# Patient Record
Sex: MALE | Race: BLACK OR AFRICAN AMERICAN | NOT HISPANIC OR LATINO
[De-identification: names, ages, dates, MRNs, and addresses within clinical notes are randomized per-mention and may not be internally consistent; named-entity substitution may affect disease eponyms.]

---

## 2018-06-11 PROBLEM — Z00.00 ENCOUNTER FOR PREVENTIVE HEALTH EXAMINATION: Status: ACTIVE | Noted: 2018-06-11

## 2018-06-15 ENCOUNTER — APPOINTMENT (OUTPATIENT)
Dept: ORTHOPEDIC SURGERY | Facility: CLINIC | Age: 53
End: 2018-06-15
Payer: COMMERCIAL

## 2018-06-15 VITALS
BODY MASS INDEX: 24.43 KG/M2 | HEART RATE: 87 BPM | HEIGHT: 66 IN | WEIGHT: 152 LBS | DIASTOLIC BLOOD PRESSURE: 90 MMHG | SYSTOLIC BLOOD PRESSURE: 142 MMHG

## 2018-06-15 DIAGNOSIS — Z86.79 PERSONAL HISTORY OF OTHER DISEASES OF THE CIRCULATORY SYSTEM: ICD-10-CM

## 2018-06-15 DIAGNOSIS — Z86.39 PERSONAL HISTORY OF OTHER ENDOCRINE, NUTRITIONAL AND METABOLIC DISEASE: ICD-10-CM

## 2018-06-15 PROCEDURE — 99203 OFFICE O/P NEW LOW 30 MIN: CPT

## 2018-06-15 PROCEDURE — 72170 X-RAY EXAM OF PELVIS: CPT | Mod: 59

## 2018-06-15 PROCEDURE — 72110 X-RAY EXAM L-2 SPINE 4/>VWS: CPT

## 2018-06-15 RX ORDER — METFORMIN HYDROCHLORIDE 625 MG/1
TABLET ORAL
Refills: 0 | Status: ACTIVE | COMMUNITY

## 2018-06-15 NOTE — DISCUSSION/SUMMARY
[Medication Risks Reviewed] : Medication risks reviewed [de-identified] : The patient's fairly significant weakness of his entire left leg primarily at the hips but also of the foot and ankle with ankle dorsiflexion. He does not have any neck pain no fracture his symptoms or spasticity or upper motor neuron some lower extremity suggesting a spinal cord or cranial pathology. Given the hypotonic weakness I recommended an MRI of the lumbar spine urgently to better evaluate his condition. He understands that based on the MRI findings additional interventions including surgery may be recommended for him.\par \par The patient was educated regarding their condition, treatment options as well as prescribed course of treatment. \par Risks and benefits as well as alternatives to the proposed treatment were also provided to the patient \par They were given the opportunity to have all their questions answered to their satisfaction.\par \par Vital signs were reviewed with the patient and the patient was instructed to followup with their primary care provider for further management.\par \par Healthy lifestyle recommendations were also made including a tobacco free lifestyle, proper diet, and weight control.

## 2018-06-15 NOTE — HISTORY OF PRESENT ILLNESS
[Pain] : pain [Worsening] : worsening [___ yrs] : [unfilled] year(s) ago [Sitting] : sitting [Lifting] : lifting [Constant] : ~He/She~ states the symptoms seem to be constant [Prolonged Standing] : worsened by prolonged standing [Standing] : worsened by standing [Walking] : worsened by walking [Rest] : relieved by rest [Joint Pain] : joint pain [Muscle Aches] : muscle aches [All Hx] : past medical, family, and social [All Other ROS Normal] : All other review of systems are negative except as noted [All] : medication and allergy [6] : currently ~his/her~ pain is 6 out of 10 [FreeTextEntry1] : Lower back pain [FreeTextEntry2] : Patient presents today due to lower back pain, reports ongoing for past 1 1/2 years.  No known injury.  Pain worsening over time.  Pain radiates into left leg, reports loss of sensation in leg into toes.  Reports receiving injection by neurologist on 6/4/2018 with no relief.  \par  in [employer].\par Pain in lower back and left leg pain, weakness of ankle dorsiflexion

## 2018-06-15 NOTE — PHYSICAL EXAM
[Apractic] : apractic [Full] : is full [] : Motor: [NL] : Motor strength of the right lower extremity was normal [___/5] : left ([unfilled]/5) [LE] : Sensory: Intact in bilateral lower extremities [2+] : left patella 2+ [1+] : right ankle jerk 1+ [0] : left ankle jerk 0 [DP] : dorsalis pedis 2+ and symmetric bilaterally [PT] : posterior tibial 2+ and symmetric bilaterally [Poor Appearance] : well-appearing [Acute Distress] : not in acute distress [Obese] : not obese [Abl Mood] : in a normal mood [Abl Affect] : with normal affect [Poor Coordination] : normal coordination [Disorientation] : oriented x 3 [Painful] : not painful [SLR] : negative straight leg raise [Plantar Reflex Right Only] : absent on the right [Plantar Reflex Left Only] : absent on the left [DTR Reflexes Clonus Of Right Ankle (___ Beats)] : absent on the right [DTR Reflexes Clonus Of Left Ankle (___ Beats)] : absent on the left [FreeTextEntry2] : The pt is awake, alert and oriented to self, place and time, is comfortable and in no acute distress. Inspection of neck, back and lower extremities bilaterally reveals no rashes or ecchymotic lesions.  There is no obvious abnormal spinal curvature in the sagittal and coronal planes. There is no tenderness over the cervical, thoracic or lumbar spine, or the paraspinal or upper and lower extremities musculature. There is no sacroiliac tenderness. No greater trochanteric tenderness bilaterally. No atrophy or abnormal movements noted in the upper or lower extremities. There is no swelling noted in the upper or lower extremities bilaterally. No cervical lymphadenopathy noted anteriorly. No joint laxity noted in the upper and lower extremity joints bilaterally.\par Lumbar spine range of motion is pain free with forward flexion to [default value], extension [default value] degrees with no discomfort. Hip range of motion is degrees internal rotation 30° external rotation without pain. Full range of motion of the shoulders bilaterally with no significant pain\par Negative straight leg raise to 45° in the sitting position bilaterally. There is no groin pain with hip internal rotation and a negative VERONA test bilaterally.  [de-identified] : Seen today with a friend who translated [de-identified] : 4 views lumbar spine obtained today demonstrate minimal right-sided lumbar curve. On the lateral projection normal lumbar lordosis. Mild degeneration seen at L1-2 with loss of disc height and end plates closest as well as anterior osteophytes. Between flexion-extension no dynamic bili. No acute fractures.\par \par AP pelvis demonstrates normal appearance of the hips bilaterally. No fractures. No significant degeneration.

## 2018-06-15 NOTE — CONSULT LETTER
[Dear  ___] : Dear  [unfilled], [I had the pleasure of evaluating your patient, [unfilled].] : I had the pleasure of evaluating your patient, [unfilled]. [FreeTextEntry2] : Too [place] [FreeTextEntry1] : Thank you for this referral. I have enclosed my note for your review. Please feel free to contact my office if you have additional questions regarding this patient.\par \par Regards,\par Deepak Becker MD, FACS, FAAOS\par \par  of Orthopaedic Surgery\par [facility] [employer] [facility]\par Spinal Reconstruction Surgery\par Minimally Invasive Spinal Surgery\par [facility]

## 2018-06-16 ENCOUNTER — OUTPATIENT (OUTPATIENT)
Dept: OUTPATIENT SERVICES | Facility: HOSPITAL | Age: 53
LOS: 1 days | End: 2018-06-16
Payer: COMMERCIAL

## 2018-06-16 ENCOUNTER — APPOINTMENT (OUTPATIENT)
Dept: MRI IMAGING | Facility: CLINIC | Age: 53
End: 2018-06-16
Payer: COMMERCIAL

## 2018-06-16 DIAGNOSIS — Z00.8 ENCOUNTER FOR OTHER GENERAL EXAMINATION: ICD-10-CM

## 2018-06-16 PROCEDURE — 72148 MRI LUMBAR SPINE W/O DYE: CPT | Mod: 26

## 2018-06-16 PROCEDURE — 72148 MRI LUMBAR SPINE W/O DYE: CPT

## 2018-06-29 ENCOUNTER — APPOINTMENT (OUTPATIENT)
Dept: ORTHOPEDIC SURGERY | Facility: CLINIC | Age: 53
End: 2018-06-29
Payer: COMMERCIAL

## 2018-06-29 VITALS
HEART RATE: 92 BPM | WEIGHT: 152 LBS | BODY MASS INDEX: 24.43 KG/M2 | DIASTOLIC BLOOD PRESSURE: 94 MMHG | HEIGHT: 66 IN | SYSTOLIC BLOOD PRESSURE: 152 MMHG

## 2018-06-29 PROCEDURE — 99214 OFFICE O/P EST MOD 30 MIN: CPT

## 2018-09-24 ENCOUNTER — APPOINTMENT (OUTPATIENT)
Dept: ORTHOPEDIC SURGERY | Facility: CLINIC | Age: 53
End: 2018-09-24
Payer: COMMERCIAL

## 2018-09-24 VITALS
DIASTOLIC BLOOD PRESSURE: 82 MMHG | HEART RATE: 87 BPM | SYSTOLIC BLOOD PRESSURE: 180 MMHG | BODY MASS INDEX: 24.21 KG/M2 | WEIGHT: 150 LBS

## 2018-09-24 PROCEDURE — 99214 OFFICE O/P EST MOD 30 MIN: CPT

## 2018-11-19 ENCOUNTER — APPOINTMENT (OUTPATIENT)
Dept: NEUROLOGY | Facility: CLINIC | Age: 53
End: 2018-11-19
Payer: COMMERCIAL

## 2018-11-19 PROCEDURE — 95909 NRV CNDJ TST 5-6 STUDIES: CPT

## 2018-11-19 PROCEDURE — 95886 MUSC TEST DONE W/N TEST COMP: CPT

## 2018-11-21 ENCOUNTER — CHART COPY (OUTPATIENT)
Age: 53
End: 2018-11-21

## 2019-01-02 ENCOUNTER — APPOINTMENT (OUTPATIENT)
Dept: ORTHOPEDIC SURGERY | Facility: CLINIC | Age: 54
End: 2019-01-02

## 2019-02-22 ENCOUNTER — APPOINTMENT (OUTPATIENT)
Dept: ORTHOPEDIC SURGERY | Facility: CLINIC | Age: 54
End: 2019-02-22
Payer: COMMERCIAL

## 2019-02-22 VITALS
DIASTOLIC BLOOD PRESSURE: 89 MMHG | BODY MASS INDEX: 24.27 KG/M2 | HEART RATE: 91 BPM | WEIGHT: 151 LBS | HEIGHT: 66 IN | SYSTOLIC BLOOD PRESSURE: 135 MMHG

## 2019-02-22 PROCEDURE — 99214 OFFICE O/P EST MOD 30 MIN: CPT

## 2019-02-22 NOTE — DISCUSSION/SUMMARY
[Medication Risks Reviewed] : Medication risks reviewed [de-identified] : The patient continues to complain of weakness of his left leg especially of his hip flexors and his ankle dorsiflexors. He has more strength of knee extension. Nerve conduction studies did not reveal any radiculopathy an MRI lumbar spine did not reveal any significant pathology to explain his current symptoms. I recommended an MRI of the pelvis to complete evaluation of the lumbar plexus and his hip musculature. Recommended AFO which was prescribed for him today. Also recommended that he followup with a neurologist Dr. Vallecillo for consultation and additional diagnostic workup as appropriate. He understands the need for additional testing and treatment since I do not have a clear diagnosis of his current symptoms and cannot recommend any specific treatment without appropriate diagnosis.\par \par The patient was educated regarding their condition, treatment options as well as prescribed course of treatment. \par Risks and benefits as well as alternatives to the proposed treatment were also provided to the patient \par They were given the opportunity to have all their questions answered to their satisfaction.\par \par Vital signs were reviewed with the patient and the patient was instructed to followup with their primary care provider for further management.\par \par Healthy lifestyle recommendations were also made including a tobacco free lifestyle, proper diet, and weight control.

## 2019-02-22 NOTE — HISTORY OF PRESENT ILLNESS
[Worsening] : worsening [0] : a current pain level of 0/10 [Daily] : ~He/She~ states the symptoms seem to be occuring daily [Walking] : worsened by walking [None] : No relieving factors are noted [de-identified] : Patient is here today for re evaluation on his chronic left leg weakness. Patient states he saw Neurologist had emg/ncv told everything is negative. Patient states left leg is getting worse.\par Symptoms for 2 years. Worsening. Initially had flank

## 2019-02-22 NOTE — PHYSICAL EXAM
[de-identified] : Nerve conduction studies performed by Dr. Vallecillo on November 19, 2018 were reviewed with the patient. Normal study is noted and not consistent with a radiculopathy [Poor Appearance] : well-appearing [Acute Distress] : not in acute distress [Obese] : not obese [Abl Mood] : in a normal mood [Abl Affect] : with normal affect [Poor Coordination] : normal coordination [Disorientation] : oriented x 3 [Apractic] : apractic [Full] : is full [Painful] : not painful [SLR] : negative straight leg raise [] : Motor: [NL] : Motor strength of the right lower extremity was normal [___/5] : left ([unfilled]/5) [LE] : Sensory: Intact in bilateral lower extremities [2+] : left patella 2+ [1+] : right ankle jerk 1+ [0] : left ankle jerk 0 [Plantar Reflex Right Only] : absent on the right [Plantar Reflex Left Only] : absent on the left [DTR Reflexes Clonus Of Right Ankle (___ Beats)] : absent on the right [DTR Reflexes Clonus Of Left Ankle (___ Beats)] : absent on the left [DP] : dorsalis pedis 2+ and symmetric bilaterally [PT] : posterior tibial 2+ and symmetric bilaterally [FreeTextEntry2] : The pt is awake, alert and oriented to self, place and time, is comfortable and in no acute distress. Inspection of neck, back and lower extremities bilaterally reveals no rashes or ecchymotic lesions.  There is no obvious abnormal spinal curvature in the sagittal and coronal planes. There is no tenderness over the cervical, thoracic or lumbar spine, or the paraspinal or upper and lower extremities musculature. There is no sacroiliac tenderness. No greater trochanteric tenderness bilaterally. No atrophy or abnormal movements noted in the upper or lower extremities. There is no swelling noted in the upper or lower extremities bilaterally. No cervical lymphadenopathy noted anteriorly. No joint laxity noted in the upper and lower extremity joints bilaterally.\par Lumbar spine range of motion is pain free with forward flexion to [default value], extension [default value] degrees with no discomfort. Hip range of motion is degrees internal rotation 30° external rotation without pain. Full range of motion of the shoulders bilaterally with no significant pain\par Negative straight leg raise to 45° in the sitting position bilaterally. There is no groin pain with hip internal rotation and a negative VERONA test bilaterally.  [de-identified] : Seen today with a friend who translated

## 2022-06-13 ENCOUNTER — INPATIENT (INPATIENT)
Facility: HOSPITAL | Age: 57
LOS: 1 days | Discharge: HOME | DRG: 552 | End: 2022-06-15
Attending: INTERNAL MEDICINE | Admitting: INTERNAL MEDICINE
Payer: COMMERCIAL

## 2022-06-13 VITALS
OXYGEN SATURATION: 98 % | DIASTOLIC BLOOD PRESSURE: 92 MMHG | TEMPERATURE: 98 F | SYSTOLIC BLOOD PRESSURE: 184 MMHG | WEIGHT: 149.91 LBS | HEIGHT: 65 IN | HEART RATE: 89 BPM | RESPIRATION RATE: 16 BRPM

## 2022-06-13 DIAGNOSIS — Z29.9 ENCOUNTER FOR PROPHYLACTIC MEASURES, UNSPECIFIED: ICD-10-CM

## 2022-06-13 DIAGNOSIS — R10.9 UNSPECIFIED ABDOMINAL PAIN: ICD-10-CM

## 2022-06-13 DIAGNOSIS — R29.898 OTHER SYMPTOMS AND SIGNS INVOLVING THE MUSCULOSKELETAL SYSTEM: ICD-10-CM

## 2022-06-13 DIAGNOSIS — E11.9 TYPE 2 DIABETES MELLITUS WITHOUT COMPLICATIONS: ICD-10-CM

## 2022-06-13 DIAGNOSIS — E78.5 HYPERLIPIDEMIA, UNSPECIFIED: ICD-10-CM

## 2022-06-13 DIAGNOSIS — I10 ESSENTIAL (PRIMARY) HYPERTENSION: ICD-10-CM

## 2022-06-13 DIAGNOSIS — I63.9 CEREBRAL INFARCTION, UNSPECIFIED: ICD-10-CM

## 2022-06-13 DIAGNOSIS — M25.552 PAIN IN LEFT HIP: ICD-10-CM

## 2022-06-13 LAB
ALBUMIN SERPL ELPH-MCNC: 3.9 G/DL — SIGNIFICANT CHANGE UP (ref 3.3–5)
ALP SERPL-CCNC: 104 U/L — SIGNIFICANT CHANGE UP (ref 30–120)
ALT FLD-CCNC: 24 U/L DA — SIGNIFICANT CHANGE UP (ref 10–60)
ANION GAP SERPL CALC-SCNC: 9 MMOL/L — SIGNIFICANT CHANGE UP (ref 5–17)
APTT BLD: 28.1 SEC — SIGNIFICANT CHANGE UP (ref 27.5–35.5)
AST SERPL-CCNC: 16 U/L — SIGNIFICANT CHANGE UP (ref 10–40)
BASOPHILS # BLD AUTO: 0.04 K/UL — SIGNIFICANT CHANGE UP (ref 0–0.2)
BASOPHILS NFR BLD AUTO: 0.9 % — SIGNIFICANT CHANGE UP (ref 0–2)
BILIRUB SERPL-MCNC: 0.8 MG/DL — SIGNIFICANT CHANGE UP (ref 0.2–1.2)
BUN SERPL-MCNC: 14 MG/DL — SIGNIFICANT CHANGE UP (ref 7–23)
CALCIUM SERPL-MCNC: 9.6 MG/DL — SIGNIFICANT CHANGE UP (ref 8.4–10.5)
CHLORIDE SERPL-SCNC: 97 MMOL/L — SIGNIFICANT CHANGE UP (ref 96–108)
CO2 SERPL-SCNC: 30 MMOL/L — SIGNIFICANT CHANGE UP (ref 22–31)
CREAT SERPL-MCNC: 1.08 MG/DL — SIGNIFICANT CHANGE UP (ref 0.5–1.3)
EGFR: 81 ML/MIN/1.73M2 — SIGNIFICANT CHANGE UP
EOSINOPHIL # BLD AUTO: 0.09 K/UL — SIGNIFICANT CHANGE UP (ref 0–0.5)
EOSINOPHIL NFR BLD AUTO: 1.9 % — SIGNIFICANT CHANGE UP (ref 0–6)
GLUCOSE BLDC GLUCOMTR-MCNC: 201 MG/DL — HIGH (ref 70–99)
GLUCOSE BLDC GLUCOMTR-MCNC: 206 MG/DL — HIGH (ref 70–99)
GLUCOSE BLDC GLUCOMTR-MCNC: 242 MG/DL — HIGH (ref 70–99)
GLUCOSE BLDC GLUCOMTR-MCNC: 284 MG/DL — HIGH (ref 70–99)
GLUCOSE SERPL-MCNC: 300 MG/DL — HIGH (ref 70–99)
HCT VFR BLD CALC: 45.3 % — SIGNIFICANT CHANGE UP (ref 39–50)
HGB BLD-MCNC: 15.2 G/DL — SIGNIFICANT CHANGE UP (ref 13–17)
IMM GRANULOCYTES NFR BLD AUTO: 0.2 % — SIGNIFICANT CHANGE UP (ref 0–1.5)
INR BLD: 0.97 RATIO — SIGNIFICANT CHANGE UP (ref 0.88–1.16)
LYMPHOCYTES # BLD AUTO: 2.5 K/UL — SIGNIFICANT CHANGE UP (ref 1–3.3)
LYMPHOCYTES # BLD AUTO: 53.5 % — HIGH (ref 13–44)
MCHC RBC-ENTMCNC: 25.9 PG — LOW (ref 27–34)
MCHC RBC-ENTMCNC: 33.6 GM/DL — SIGNIFICANT CHANGE UP (ref 32–36)
MCV RBC AUTO: 77 FL — LOW (ref 80–100)
MONOCYTES # BLD AUTO: 0.46 K/UL — SIGNIFICANT CHANGE UP (ref 0–0.9)
MONOCYTES NFR BLD AUTO: 9.9 % — SIGNIFICANT CHANGE UP (ref 2–14)
NEUTROPHILS # BLD AUTO: 1.57 K/UL — LOW (ref 1.8–7.4)
NEUTROPHILS NFR BLD AUTO: 33.6 % — LOW (ref 43–77)
NRBC # BLD: 0 /100 WBCS — SIGNIFICANT CHANGE UP (ref 0–0)
PLATELET # BLD AUTO: 256 K/UL — SIGNIFICANT CHANGE UP (ref 150–400)
POTASSIUM SERPL-MCNC: 4.4 MMOL/L — SIGNIFICANT CHANGE UP (ref 3.5–5.3)
POTASSIUM SERPL-SCNC: 4.4 MMOL/L — SIGNIFICANT CHANGE UP (ref 3.5–5.3)
PROT SERPL-MCNC: 7.5 G/DL — SIGNIFICANT CHANGE UP (ref 6–8.3)
PROTHROM AB SERPL-ACNC: 11.2 SEC — SIGNIFICANT CHANGE UP (ref 10.5–13.4)
RBC # BLD: 5.88 M/UL — HIGH (ref 4.2–5.8)
RBC # FLD: 13.1 % — SIGNIFICANT CHANGE UP (ref 10.3–14.5)
SARS-COV-2 RNA SPEC QL NAA+PROBE: SIGNIFICANT CHANGE UP
SODIUM SERPL-SCNC: 136 MMOL/L — SIGNIFICANT CHANGE UP (ref 135–145)
TROPONIN I, HIGH SENSITIVITY RESULT: 7.4 NG/L — SIGNIFICANT CHANGE UP
WBC # BLD: 4.67 K/UL — SIGNIFICANT CHANGE UP (ref 3.8–10.5)
WBC # FLD AUTO: 4.67 K/UL — SIGNIFICANT CHANGE UP (ref 3.8–10.5)

## 2022-06-13 PROCEDURE — 93010 ELECTROCARDIOGRAM REPORT: CPT

## 2022-06-13 PROCEDURE — 73090 X-RAY EXAM OF FOREARM: CPT | Mod: 26,LT

## 2022-06-13 PROCEDURE — 99285 EMERGENCY DEPT VISIT HI MDM: CPT

## 2022-06-13 PROCEDURE — 70498 CT ANGIOGRAPHY NECK: CPT | Mod: 26,MA

## 2022-06-13 PROCEDURE — 73502 X-RAY EXAM HIP UNI 2-3 VIEWS: CPT | Mod: 26,LT

## 2022-06-13 PROCEDURE — 93306 TTE W/DOPPLER COMPLETE: CPT | Mod: 26

## 2022-06-13 PROCEDURE — 74019 RADEX ABDOMEN 2 VIEWS: CPT | Mod: 26

## 2022-06-13 PROCEDURE — 70496 CT ANGIOGRAPHY HEAD: CPT | Mod: 26,MA

## 2022-06-13 RX ORDER — INSULIN LISPRO 100/ML
VIAL (ML) SUBCUTANEOUS
Refills: 0 | Status: DISCONTINUED | OUTPATIENT
Start: 2022-06-13 | End: 2022-06-14

## 2022-06-13 RX ORDER — SODIUM CHLORIDE 9 MG/ML
1000 INJECTION, SOLUTION INTRAVENOUS
Refills: 0 | Status: DISCONTINUED | OUTPATIENT
Start: 2022-06-13 | End: 2022-06-14

## 2022-06-13 RX ORDER — LOSARTAN POTASSIUM 100 MG/1
50 TABLET, FILM COATED ORAL DAILY
Refills: 0 | Status: DISCONTINUED | OUTPATIENT
Start: 2022-06-13 | End: 2022-06-15

## 2022-06-13 RX ORDER — POLYETHYLENE GLYCOL 3350 17 G/17G
17 POWDER, FOR SOLUTION ORAL
Refills: 0 | Status: DISCONTINUED | OUTPATIENT
Start: 2022-06-13 | End: 2022-06-15

## 2022-06-13 RX ORDER — ASPIRIN/CALCIUM CARB/MAGNESIUM 324 MG
325 TABLET ORAL ONCE
Refills: 0 | Status: COMPLETED | OUTPATIENT
Start: 2022-06-13 | End: 2022-06-13

## 2022-06-13 RX ORDER — INSULIN LISPRO 100/ML
VIAL (ML) SUBCUTANEOUS AT BEDTIME
Refills: 0 | Status: DISCONTINUED | OUTPATIENT
Start: 2022-06-13 | End: 2022-06-14

## 2022-06-13 RX ORDER — SODIUM CHLORIDE 9 MG/ML
1000 INJECTION INTRAMUSCULAR; INTRAVENOUS; SUBCUTANEOUS ONCE
Refills: 0 | Status: COMPLETED | OUTPATIENT
Start: 2022-06-13 | End: 2022-06-13

## 2022-06-13 RX ORDER — DEXTROSE 50 % IN WATER 50 %
15 SYRINGE (ML) INTRAVENOUS ONCE
Refills: 0 | Status: DISCONTINUED | OUTPATIENT
Start: 2022-06-13 | End: 2022-06-14

## 2022-06-13 RX ORDER — INFLUENZA VIRUS VACCINE 15; 15; 15; 15 UG/.5ML; UG/.5ML; UG/.5ML; UG/.5ML
0.5 SUSPENSION INTRAMUSCULAR ONCE
Refills: 0 | Status: DISCONTINUED | OUTPATIENT
Start: 2022-06-13 | End: 2022-06-15

## 2022-06-13 RX ORDER — ENOXAPARIN SODIUM 100 MG/ML
40 INJECTION SUBCUTANEOUS EVERY 24 HOURS
Refills: 0 | Status: DISCONTINUED | OUTPATIENT
Start: 2022-06-13 | End: 2022-06-15

## 2022-06-13 RX ORDER — ATORVASTATIN CALCIUM 80 MG/1
80 TABLET, FILM COATED ORAL AT BEDTIME
Refills: 0 | Status: DISCONTINUED | OUTPATIENT
Start: 2022-06-13 | End: 2022-06-15

## 2022-06-13 RX ORDER — ASPIRIN/CALCIUM CARB/MAGNESIUM 324 MG
81 TABLET ORAL DAILY
Refills: 0 | Status: DISCONTINUED | OUTPATIENT
Start: 2022-06-13 | End: 2022-06-15

## 2022-06-13 RX ORDER — DEXTROSE 50 % IN WATER 50 %
12.5 SYRINGE (ML) INTRAVENOUS ONCE
Refills: 0 | Status: DISCONTINUED | OUTPATIENT
Start: 2022-06-13 | End: 2022-06-14

## 2022-06-13 RX ORDER — DEXTROSE 50 % IN WATER 50 %
25 SYRINGE (ML) INTRAVENOUS ONCE
Refills: 0 | Status: DISCONTINUED | OUTPATIENT
Start: 2022-06-13 | End: 2022-06-14

## 2022-06-13 RX ORDER — IOHEXOL 300 MG/ML
30 INJECTION, SOLUTION INTRAVENOUS ONCE
Refills: 0 | Status: DISCONTINUED | OUTPATIENT
Start: 2022-06-13 | End: 2022-06-15

## 2022-06-13 RX ORDER — METFORMIN HYDROCHLORIDE 850 MG/1
500 TABLET ORAL
Refills: 0 | Status: DISCONTINUED | OUTPATIENT
Start: 2022-06-13 | End: 2022-06-14

## 2022-06-13 RX ORDER — GLUCAGON INJECTION, SOLUTION 0.5 MG/.1ML
1 INJECTION, SOLUTION SUBCUTANEOUS ONCE
Refills: 0 | Status: DISCONTINUED | OUTPATIENT
Start: 2022-06-13 | End: 2022-06-15

## 2022-06-13 RX ADMIN — ENOXAPARIN SODIUM 40 MILLIGRAM(S): 100 INJECTION SUBCUTANEOUS at 15:51

## 2022-06-13 RX ADMIN — LOSARTAN POTASSIUM 50 MILLIGRAM(S): 100 TABLET, FILM COATED ORAL at 19:10

## 2022-06-13 RX ADMIN — Medication 81 MILLIGRAM(S): at 12:35

## 2022-06-13 RX ADMIN — Medication 325 MILLIGRAM(S): at 04:08

## 2022-06-13 RX ADMIN — ATORVASTATIN CALCIUM 80 MILLIGRAM(S): 80 TABLET, FILM COATED ORAL at 22:40

## 2022-06-13 RX ADMIN — Medication 3: at 19:08

## 2022-06-13 RX ADMIN — METFORMIN HYDROCHLORIDE 500 MILLIGRAM(S): 850 TABLET ORAL at 19:10

## 2022-06-13 RX ADMIN — SODIUM CHLORIDE 1000 MILLILITER(S): 9 INJECTION INTRAMUSCULAR; INTRAVENOUS; SUBCUTANEOUS at 04:36

## 2022-06-13 NOTE — ED ADULT NURSE NOTE - CHIEF COMPLAINT
The patient is a 56y Male complaining of weakness. PAST MEDICAL HISTORY:  Endometrial hyperplasia     Hypertension     Morbid obesity with BMI of 60.0-69.9, adult     Type 2 diabetes mellitus

## 2022-06-13 NOTE — PATIENT PROFILE ADULT - FALL HARM RISK - TYPE OF ASSESSMENT
[Normal] : normal rate, regular rhythm, normal S1 and S2 and no murmur heard [No Focal Deficits] : no focal deficits [Normal Affect] : the affect was normal [Normal Insight/Judgement] : insight and judgment were intact [de-identified] : shuffling gait, slow, needs assistance to get down from exam table. 6 item cognitive assessment score 23 Admission

## 2022-06-13 NOTE — H&P ADULT - NSHPPHYSICALEXAM_GEN_ALL_CORE
Vital Signs Last 24 Hrs  T(C): 36.6 (13 Jun 2022 07:43), Max: 36.7 (13 Jun 2022 03:09)  T(F): 97.8 (13 Jun 2022 07:43), Max: 98 (13 Jun 2022 03:09)  HR: 69 (13 Jun 2022 07:43) (69 - 89)  BP: 135/89 (13 Jun 2022 07:43) (135/89 - 184/92)  BP(mean): --  RR: 15 (13 Jun 2022 07:43) (15 - 16)  SpO2: 97% (13 Jun 2022 07:43) (97% - 98%) Vital Signs Last 24 Hrs  T(C): 36.6 (13 Jun 2022 07:43), Max: 36.7 (13 Jun 2022 03:09)  T(F): 97.8 (13 Jun 2022 07:43), Max: 98 (13 Jun 2022 03:09)  HR: 69 (13 Jun 2022 07:43) (69 - 89)  BP: 135/89 (13 Jun 2022 07:43) (135/89 - 184/92)  BP(mean): --  RR: 15 (13 Jun 2022 07:43) (15 - 16)  SpO2: 97% (13 Jun 2022 07:43) (97% - 98%)    PHYSICAL EXAM:  GENERAL: NAD, well-groomed, well-developed  HEAD:  Atraumatic, Normocephalic  EYES: EOMI, PERRLA, conjunctiva and sclera clear  ENMT: Moist mucous membranes, no lesions  NECK: Supple.  CHEST/LUNG: Clear to auscultation bilaterally; No rales, rhonchi, wheezing, or rubs  HEART: S1, S2.   ABDOMEN: Soft, Nontender, Nondistended; Bowel sounds present  EXTREMITIES:  2+ Peripheral Pulses, No clubbing, cyanosis, or edema  MS: No joint swelling or deformity.   LYMPH: No lymphadenopathy noted  SKIN: No rashes or lesions  NERVOUS SYSTEM:  patient has slight left leg weakness and foot drop.  Gait is not tested.  PSYCH:  Awake and alert.

## 2022-06-13 NOTE — H&P ADULT - NSHPSOCIALHISTORY_GEN_ALL_CORE
Social History:    Marital Status:   Occupation:   Lives with:     Substance Use :  Tobacco Usage:  (   ) never smoked   (   ) former smoker   (   ) current smoker  (     ) pack year  (        ) last tobacco use date  Alcohol Usage:    (     ) Advanced Directives: (     ) declined   [  ] health care proxy Social History:    Marital Status:   Occupation: Works as a   Lives with: family    Substance Use : denies  Tobacco Usage:  (X) never smoked   (   ) former smoker   (   ) current smoker  (     ) pack year  (        ) last tobacco use date  Alcohol Usage: denies    (     ) Advanced Directives: (     ) declined   [  ] health care proxy

## 2022-06-13 NOTE — H&P ADULT - HISTORY OF PRESENT ILLNESS
56 years old male with past medical history of hypertension, diabetes mellitus, hyperlipidemia, who has been having some left  56 years old male with past medical history of hypertension, diabetes mellitus, hyperlipidemia, who has been having some left leg weakness and gait abnormality for last many months.  Patient developed some lower abdominal pain few days ago and was seen at another facility.  Patient apparently had x-ray done and was advised that he had constipation.  Patient went back to work last night.  He had increasing weakness in his left leg and more pronounced footdrop.  He came into the emergency for the same.  Patient was evaluated and is being admitted to hospital as per ER physician for rule out CVA.    Patient continues to complain of left leg weakness and foot drop.  He also complains of left hip pain.  He also complained of some abdominal pain.  He denies any nausea or vomiting.  He does admit to some constipation.  Denies any fevers or chills.  Denies any dizziness or syncope.

## 2022-06-13 NOTE — H&P ADULT - NSHPLABSRESULTS_GEN_ALL_CORE
15.2   4.67  )-----------( 256      ( 13 Jun 2022 03:20 )             45.3     13 Jun 2022 03:20    136    |  97     |  14     ----------------------------<  300    4.4     |  30     |  1.08     Ca    9.6        13 Jun 2022 03:20    TPro  7.5    /  Alb  3.9    /  TBili  0.8    /  DBili  x      /  AST  16     /  ALT  24     /  AlkPhos  104    13 Jun 2022 03:20    CAPILLARY BLOOD GLUCOSE      POCT Blood Glucose.: 206 mg/dL (13 Jun 2022 11:52)  POCT Blood Glucose.: 253 mg/dL (13 Jun 2022 03:32)    PT/INR - ( 13 Jun 2022 03:20 )   PT: 11.2 sec;   INR: 0.97 ratio         PTT - ( 13 Jun 2022 03:20 )  PTT:28.1 sec

## 2022-06-13 NOTE — PATIENT PROFILE ADULT - FALL HARM RISK - HARM RISK INTERVENTIONS

## 2022-06-13 NOTE — ED PROVIDER NOTE - NEUROLOGICAL, MLM
alert, oriented, speech clear, comprehension intact, follows commands, left LE 4/5 motor, not dorsiflexing left foot to walk, ataxic, normal sensation, UEs and right LE 5/5

## 2022-06-13 NOTE — ED ADULT NURSE NOTE - OBJECTIVE STATEMENT
Pt stated that has been having problem with left leg for the past 5 years, was hurting and was weak, and has been having some "balance problem" for the past wks, last night, woke up and knock on his daughter's door, stated that his leg is feeling very weak and needs to go to the ER, pt able to ambulate with unsteady gait

## 2022-06-13 NOTE — CONSULT NOTE ADULT - SUBJECTIVE AND OBJECTIVE BOX
left leg weakness lbp rib leg pain ? lumbar rad rule out cva  for mri head lumbar and pelvis   pt eval  asa/statin for now  based on mri plan next step

## 2022-06-13 NOTE — ED PROVIDER NOTE - CLINICAL SUMMARY MEDICAL DECISION MAKING FREE TEXT BOX
56 y.o. M with DM and HTN BIB family for left LE weakness, pt reports some degree of chronic left LE weakness/unsteady gait - unclear if weeks/months/years, however, it appears clear that the weakness of the left leg significantly worsened when pt woke yesterday and gait is also worse - symptoms c/w acute stroke, onset is unclear, over 24hrs ago, appears that pt was seen at [facility] 5yrs ago for sciatica, which he blends into the h/o altered gait/weakness, seemingly did not have medical evaluation previously when gait became unsteady and weakness began as pt seems to have thought it was part of the previous issue

## 2022-06-13 NOTE — ED PROVIDER NOTE - OBJECTIVE STATEMENT
56 y.o. M with h/o DM and HTN BIB daughter c/o left leg weakness, pt states 5 years ago he went to [facility] for left leg pain, description by pt c/w sciatica, though states he was told nothing was wrong (and to take dulcolax for constipation?), then pt states for months (or years? answers change) he has had an unsteady gait and for weeks has felt weakness in left leg, but yesterday weakness became significantly worse, noticed the difference when he woke in the morning, says gait is worse as well, he seems to be dragging his foot instead of taking normal steps, denies paresthesias, feels his arms are strong, denies other neuro symptoms, denies recent illness, no fever, no cp, no sob, did have decreased appetite yesterday and decreased PO, no n/v, no abd pain (pt is a poor historian, unclear how long pt's gait has been unsteady or for how long he has had weakness in left leg  - weeks to years- but clearly the weakness in left LE became significantly worse yesterday, here now as he wasn't sleeping and woke his family to bring him to the hospital

## 2022-06-13 NOTE — H&P ADULT - NSHPREVIEWOFSYSTEMS_GEN_ALL_CORE
REVIEW OF SYSTEMS:  CONSTITUTIONAL: No fever, weight loss, or fatigue  EYES: No eye pain, or discharge  ENMT: No sinus or throat pain  NECK: No pain or stiffness  BREASTS: No pain, masses, or nipple discharge  RESPIRATORY: No cough, wheezing, chills or hemoptysis; No shortness of breath  CARDIOVASCULAR: No chest pain, palpitations, dizziness, or leg swelling  GASTROINTESTINAL: + abdominal pain.  No nausea, vomiting.  GENITOURINARY: No dysuria, frequency, hematuria, or incontinence  NEUROLOGICAL: Left leg weakness.   SKIN: No itching, burning, rashes, or lesions   LYMPH NODES: No enlarged glands  ENDOCRINE: No polydipsia or polyuria  MUSCULOSKELETAL: Left hip pain.   PSYCHIATRIC: No depression, anxiety, mood swings.  HEME/LYMPH: No easy bruising, or bleeding gums  ALLERGY AND IMMUNOLOGIC: No hives or eczema

## 2022-06-13 NOTE — H&P ADULT - VTE RISK ASSESSMENT
# disconnected-unable to reach pt about a new pt sleep apt-will mail letter. VTE Assessment already completed for this visit

## 2022-06-13 NOTE — ED ADULT NURSE NOTE - NSIMPLEMENTINTERV_GEN_ALL_ED
Implemented All Fall with Harm Risk Interventions:  [place] to call system. Call bell, personal items and telephone within reach. Instruct patient to call for assistance. Room bathroom lighting operational. Non-slip footwear when patient is off stretcher. Physically safe environment: no spills, clutter or unnecessary equipment. Stretcher in lowest position, wheels locked, appropriate side rails in place. Provide visual cue, wrist band, yellow gown, etc. Monitor gait and stability. Monitor for mental status changes and reorient to person, place, and time. Review medications for side effects contributing to fall risk. Reinforce activity limits and safety measures with patient and family. Provide visual clues: red socks.

## 2022-06-13 NOTE — H&P ADULT - ASSESSMENT
56 years old male with past medical history of hypertension, diabetes mellitus, hyperlipidemia, who has been having some left leg weakness and gait abnormality for last many months.  Patient developed some lower abdominal pain few days ago and was seen at another facility.  Patient apparently had x-ray done and was advised that he had constipation.  Patient went back to work last night.  He had increasing weakness in his left leg and more pronounced footdrop.  He came into the emergency for the same.  Patient was evaluated and is being admitted to hospital as per ER physician for rule out CVA.

## 2022-06-13 NOTE — ED ADULT NURSE NOTE - ASSOCIATED PAIN OR INJURY LOCATION
Formerly [facility], [facility]  Enedina Gipson P.A.-C.  [address] [address]  [place] [address]  Fax: [phone]   Authorization for Release/Disclosure of   Protected Health Information   Name: GRACIE MELCHOR : 1967 SSN: [id number]   Address: [address] [place] [address] [address]  [place] [address] Phone:    [phone] (home)    I authorize the entity listed below to release/disclose the PHI below to:   [facility], [facility]/Enedina Gipson P.A.-C. and Enedina Gipson P.A.-C.   Provider or Entity Name:  Dr. Eva Calvert   [facility], [facility]  [address] [place], [place] [address] Phone:  [phone]    Fax:  [phone]   Reason for request: continuity of care   Information to be released:    [  ] LAST COLONOSCOPY,  including any PATH REPORT and follow-up  [  ] LAST FIT/COLOGUARD RESULT [  ] LAST DEXA  [x] LAST MAMMOGRAM  [x] LAST PAP  [x] LAST LABS [  ] RETINA EXAM REPORT  [X] IMMUNIZATION RECORDS  [XX] Release all info      [  ] Check here and initial the line next to each item to release ALL health information INCLUDING  _____ Care and treatment for drug and / or alcohol abuse  _____ HIV testing, infection status, or AIDS  _____ Genetic Testing    DATES OF SERVICE OR TIME PERIOD TO BE DISCLOSED: _____________  I understand and acknowledge that:  * This Authorization may be revoked at any time by you in writing, except if your health information has already been used or disclosed.  * Your health information that will be used or disclosed as a result of you signing this authorization could be re-disclosed by the recipient. If this occurs, your re-disclosed health information may no longer be protected by State or Federal laws.  * You may refuse to sign this Authorization. Your refusal will not affect your ability to obtain treatment.  * This Authorization becomes effective upon signing and will  on (date) __________.      If no date is indicated, this Authorization will  one (1) year from the  signature date.    Name: Cecilia Taveras    Signature:   Date:     12/8/2017       PLEASE FAX REQUESTED RECORDS BACK TO: [phone]   left leg

## 2022-06-14 DIAGNOSIS — E11.65 TYPE 2 DIABETES MELLITUS WITH HYPERGLYCEMIA: ICD-10-CM

## 2022-06-14 DIAGNOSIS — E11.9 TYPE 2 DIABETES MELLITUS WITHOUT COMPLICATIONS: ICD-10-CM

## 2022-06-14 LAB
A1C WITH ESTIMATED AVERAGE GLUCOSE RESULT: 11.6 % — HIGH (ref 4–5.6)
ANION GAP SERPL CALC-SCNC: 8 MMOL/L — SIGNIFICANT CHANGE UP (ref 5–17)
BUN SERPL-MCNC: 13 MG/DL — SIGNIFICANT CHANGE UP (ref 7–23)
CALCIUM SERPL-MCNC: 9 MG/DL — SIGNIFICANT CHANGE UP (ref 8.4–10.5)
CHLORIDE SERPL-SCNC: 100 MMOL/L — SIGNIFICANT CHANGE UP (ref 96–108)
CHOLEST SERPL-MCNC: 167 MG/DL — SIGNIFICANT CHANGE UP
CO2 SERPL-SCNC: 28 MMOL/L — SIGNIFICANT CHANGE UP (ref 22–31)
CREAT SERPL-MCNC: 1.06 MG/DL — SIGNIFICANT CHANGE UP (ref 0.5–1.3)
EGFR: 82 ML/MIN/1.73M2 — SIGNIFICANT CHANGE UP
ESTIMATED AVERAGE GLUCOSE: 286 MG/DL — HIGH (ref 68–114)
GLUCOSE BLDC GLUCOMTR-MCNC: 196 MG/DL — HIGH (ref 70–99)
GLUCOSE BLDC GLUCOMTR-MCNC: 198 MG/DL — HIGH (ref 70–99)
GLUCOSE BLDC GLUCOMTR-MCNC: 220 MG/DL — HIGH (ref 70–99)
GLUCOSE BLDC GLUCOMTR-MCNC: 288 MG/DL — HIGH (ref 70–99)
GLUCOSE SERPL-MCNC: 222 MG/DL — HIGH (ref 70–99)
HCV AB S/CO SERPL IA: 0.11 S/CO — SIGNIFICANT CHANGE UP (ref 0–0.99)
HCV AB SERPL-IMP: SIGNIFICANT CHANGE UP
HDLC SERPL-MCNC: 40 MG/DL — LOW
LIPID PNL WITH DIRECT LDL SERPL: 110 MG/DL — HIGH
NON HDL CHOLESTEROL: 127 MG/DL — SIGNIFICANT CHANGE UP
POTASSIUM SERPL-MCNC: 4.8 MMOL/L — SIGNIFICANT CHANGE UP (ref 3.5–5.3)
POTASSIUM SERPL-SCNC: 4.8 MMOL/L — SIGNIFICANT CHANGE UP (ref 3.5–5.3)
SODIUM SERPL-SCNC: 136 MMOL/L — SIGNIFICANT CHANGE UP (ref 135–145)
TRIGL SERPL-MCNC: 85 MG/DL — SIGNIFICANT CHANGE UP

## 2022-06-14 PROCEDURE — 73700 CT LOWER EXTREMITY W/O DYE: CPT | Mod: 26,LT

## 2022-06-14 PROCEDURE — 70450 CT HEAD/BRAIN W/O DYE: CPT | Mod: 26

## 2022-06-14 PROCEDURE — 72131 CT LUMBAR SPINE W/O DYE: CPT | Mod: 26

## 2022-06-14 PROCEDURE — 99221 1ST HOSP IP/OBS SF/LOW 40: CPT

## 2022-06-14 PROCEDURE — 74176 CT ABD & PELVIS W/O CONTRAST: CPT | Mod: 26

## 2022-06-14 RX ORDER — DEXTROSE 50 % IN WATER 50 %
25 SYRINGE (ML) INTRAVENOUS ONCE
Refills: 0 | Status: DISCONTINUED | OUTPATIENT
Start: 2022-06-14 | End: 2022-06-15

## 2022-06-14 RX ORDER — SODIUM CHLORIDE 9 MG/ML
1000 INJECTION, SOLUTION INTRAVENOUS
Refills: 0 | Status: DISCONTINUED | OUTPATIENT
Start: 2022-06-14 | End: 2022-06-15

## 2022-06-14 RX ORDER — INSULIN LISPRO 100/ML
VIAL (ML) SUBCUTANEOUS
Refills: 0 | Status: DISCONTINUED | OUTPATIENT
Start: 2022-06-14 | End: 2022-06-15

## 2022-06-14 RX ORDER — DEXTROSE 50 % IN WATER 50 %
12.5 SYRINGE (ML) INTRAVENOUS ONCE
Refills: 0 | Status: DISCONTINUED | OUTPATIENT
Start: 2022-06-14 | End: 2022-06-15

## 2022-06-14 RX ORDER — GLUCAGON INJECTION, SOLUTION 0.5 MG/.1ML
1 INJECTION, SOLUTION SUBCUTANEOUS ONCE
Refills: 0 | Status: DISCONTINUED | OUTPATIENT
Start: 2022-06-14 | End: 2022-06-15

## 2022-06-14 RX ORDER — INSULIN LISPRO 100/ML
2 VIAL (ML) SUBCUTANEOUS
Refills: 0 | Status: DISCONTINUED | OUTPATIENT
Start: 2022-06-14 | End: 2022-06-15

## 2022-06-14 RX ORDER — DEXTROSE 50 % IN WATER 50 %
15 SYRINGE (ML) INTRAVENOUS ONCE
Refills: 0 | Status: DISCONTINUED | OUTPATIENT
Start: 2022-06-14 | End: 2022-06-15

## 2022-06-14 RX ORDER — INSULIN GLARGINE 100 [IU]/ML
8 INJECTION, SOLUTION SUBCUTANEOUS AT BEDTIME
Refills: 0 | Status: DISCONTINUED | OUTPATIENT
Start: 2022-06-14 | End: 2022-06-15

## 2022-06-14 RX ORDER — INSULIN LISPRO 100/ML
VIAL (ML) SUBCUTANEOUS AT BEDTIME
Refills: 0 | Status: DISCONTINUED | OUTPATIENT
Start: 2022-06-14 | End: 2022-06-15

## 2022-06-14 RX ADMIN — Medication 2: at 16:53

## 2022-06-14 RX ADMIN — ATORVASTATIN CALCIUM 80 MILLIGRAM(S): 80 TABLET, FILM COATED ORAL at 22:16

## 2022-06-14 RX ADMIN — Medication 1: at 08:07

## 2022-06-14 RX ADMIN — POLYETHYLENE GLYCOL 3350 17 GRAM(S): 17 POWDER, FOR SOLUTION ORAL at 16:55

## 2022-06-14 RX ADMIN — Medication 3: at 11:44

## 2022-06-14 RX ADMIN — ENOXAPARIN SODIUM 40 MILLIGRAM(S): 100 INJECTION SUBCUTANEOUS at 14:44

## 2022-06-14 RX ADMIN — Medication 2 UNIT(S): at 16:53

## 2022-06-14 RX ADMIN — INSULIN GLARGINE 8 UNIT(S): 100 INJECTION, SOLUTION SUBCUTANEOUS at 22:17

## 2022-06-14 RX ADMIN — LOSARTAN POTASSIUM 50 MILLIGRAM(S): 100 TABLET, FILM COATED ORAL at 06:28

## 2022-06-14 RX ADMIN — Medication 81 MILLIGRAM(S): at 11:11

## 2022-06-14 NOTE — PHYSICAL THERAPY INITIAL EVALUATION ADULT - PERTINENT HX OF CURRENT PROBLEM, REHAB EVAL
Admitted with worsening left LE weakness, left foot drop, abnormal gait x months. CT head negative for acute hemorrhage.

## 2022-06-14 NOTE — CONSULT NOTE ADULT - ASSESSMENT
Physical Exam:   Vital Signs Last 24 Hrs  T(C): 36.6 (14 Jun 2022 14:45), Max: 36.8 (14 Jun 2022 05:45)  T(F): 97.8 (14 Jun 2022 14:45), Max: 98.3 (14 Jun 2022 05:45)  HR: 70 (14 Jun 2022 14:45) (70 - 81)  BP: 151/96 (14 Jun 2022 14:45) (139/84 - 161/102)  BP(mean): --  RR: 17 (14 Jun 2022 14:45) (17 - 18)  SpO2: 97% (14 Jun 2022 14:45) (97% - 100%)    General: NAD, denies Fever, chills  CVS: S1S2 no M/R/G  Resp: CTA in all fields  : no freq, no urgency, no dysuria  Extremeties: no edema, + pulses         CAPILLARY BLOOD GLUCOSE      POCT Blood Glucose.: 288 mg/dL (14 Jun 2022 11:39)  POCT Blood Glucose.: 196 mg/dL (14 Jun 2022 07:25)  POCT Blood Glucose.: 201 mg/dL (13 Jun 2022 22:35)  POCT Blood Glucose.: 284 mg/dL (13 Jun 2022 19:06)  POCT Blood Glucose.: 242 mg/dL (13 Jun 2022 16:23)      Cholesterol, Serum: 113 mg/dL (05.19.21 @ 08:36)     HDL Cholesterol, Serum: 22 mg/dL (05.19.21 @ 08:36)     LDL Cholesterol Calculated: 66 mg/dL (05.19.21 @ 08:36)     DIET: CC  >50%        
The patient is a 56 year old male with a history of HTN, HL, DM who presented with left leg weakness and difficulty walking.    Plan:  - ECG with no evidence of ischemia or infarction  - Echo with normal LV systolic function, no significant valve issues  - Telemetry with no events  - Continue losartan 50 mg daily  - Continue aspirin  - Continue atorvastatin 80  mg daily  - Neurology follow-up  - MRI head planned

## 2022-06-14 NOTE — OCCUPATIONAL THERAPY INITIAL EVALUATION ADULT - BALANCE TRAINING, PT EVAL
Goal: Pt will improve dynamic standing balance to G to improve safety/independence for ADL/IADL completing in 2-3 sessions.

## 2022-06-14 NOTE — OCCUPATIONAL THERAPY INITIAL EVALUATION ADULT - PERTINENT HX OF CURRENT PROBLEM, REHAB EVAL
56 year old male with a history of HTN, HL, DM who presented with left leg weakness and difficulty walking. He denies chest pain, shortness of breath, palpitations, visual or speech changes. No prior cardiac issues or testing.

## 2022-06-14 NOTE — CONSULT NOTE ADULT - PROBLEM SELECTOR RECOMMENDATION 9
Type 2  A1c 11.6% adm CVA  inhospital:  CC diet  Accucheck ACHS  spoke w/ Dr. Ku  Moderate ISS, add ademelog 2 Units premeal  8 units Lantus @ HS  Recommend endocrine-Perlman onconsult  FU appt: Dr. Clifton Mancilla July 14  DSC recommendations: return to home regimen and glucose monitoring, pending Dr. Perlman [facility]  diabetes education provided  Diabetes support info and cell # [phone] given   Goal 100-180 mg/dL; 140-180 mg/dL in critical care areas

## 2022-06-14 NOTE — PROGRESS NOTE ADULT - SUBJECTIVE AND OBJECTIVE BOX
Patient is a 56y old  Male who presents with a chief complaint of Left leg weakness. (14 Jun 2022 16:01)    HPI:  56 years old male with past medical history of hypertension, diabetes mellitus, hyperlipidemia, who has been having some left leg weakness and gait abnormality for last many months.  Patient developed some lower abdominal pain few days ago and was seen at another facility.  Patient apparently had x-ray done and was advised that he had constipation.  Patient went back to work last night.  He had increasing weakness in his left leg and more pronounced footdrop.  He came into the emergency for the same.  Patient was evaluated and is being admitted to hospital as per ER physician for rule out CVA.    Patient continues to complain of left leg weakness and foot drop.  He also complains of left hip pain.  He also complained of some abdominal pain.  He denies any nausea or vomiting.  He does admit to some constipation.  Denies any fevers or chills.  Denies any dizziness or syncope. (13 Jun 2022 13:04)    INTERVAL HPI/OVERNIGHT EVENTS:  Chart reviewed, notes reviewed.   Patient seen and examined.  Being followed by following specialists.     Consultant(s) Notes Reviewed:  [X] Yes    Care Discussed with Consultants/Other Providers: [X] Yes    REVIEW OF SYSTEMS:  CONSTITUTIONAL: No fever, weight loss, or fatigue  EYES: No eye pain, or discharge  ENMT: No sinus or throat pain  NECK: No pain or stiffness  BREASTS: No pain, masses, or nipple discharge  RESPIRATORY: No cough, wheezing, chills or hemoptysis; No shortness of breath  CARDIOVASCULAR: No chest pain, palpitations, dizziness, or leg swelling  GASTROINTESTINAL: No abdominal or epigastric pain. No nausea, vomiting.  GENITOURINARY: No dysuria, frequency, hematuria, or incontinence  NEUROLOGICAL: No loss of strength, numbness, or tremors  SKIN: No itching, burning, rashes, or lesions   LYMPH NODES: No enlarged glands  ENDOCRINE: No polydipsia or polyuria  MUSCULOSKELETAL: No muscle, back, or extremity pain  PSYCHIATRIC: No depression, anxiety, mood swings.  HEME/LYMPH: No easy bruising, or bleeding gums  ALLERGY AND IMMUNOLOGIC: No hives or eczema    Allergies    No Known Allergies    Intolerances      Home Medications:    MEDICATIONS  (STANDING):  aspirin enteric coated 81 milliGRAM(s) Oral daily  atorvastatin 80 milliGRAM(s) Oral at bedtime  dextrose 5%. 1000 milliLiter(s) (50 mL/Hr) IV Continuous <Continuous>  dextrose 5%. 1000 milliLiter(s) (100 mL/Hr) IV Continuous <Continuous>  dextrose 50% Injectable 25 Gram(s) IV Push once  dextrose 50% Injectable 12.5 Gram(s) IV Push once  dextrose 50% Injectable 25 Gram(s) IV Push once  enoxaparin Injectable 40 milliGRAM(s) SubCutaneous every 24 hours  glucagon  Injectable 1 milliGRAM(s) IntraMuscular once  glucagon  Injectable 1 milliGRAM(s) IntraMuscular once  influenza   Vaccine 0.5 milliLiter(s) IntraMuscular once  insulin glargine Injectable (LANTUS) 8 Unit(s) SubCutaneous at bedtime  insulin lispro (ADMELOG) corrective regimen sliding scale   SubCutaneous three times a day before meals  insulin lispro (ADMELOG) corrective regimen sliding scale   SubCutaneous at bedtime  insulin lispro Injectable (ADMELOG) 2 Unit(s) SubCutaneous three times a day before meals  iohexol 300 mG (iodine)/mL Oral Solution 30 milliLiter(s) Oral once  losartan 50 milliGRAM(s) Oral daily  polyethylene glycol 3350 17 Gram(s) Oral two times a day    MEDICATIONS  (PRN):  dextrose Oral Gel 15 Gram(s) Oral once PRN Blood Glucose LESS THAN 70 milliGRAM(s)/deciliter    Vital Signs Last 24 Hrs  T(C): 36.7 (14 Jun 2022 16:45), Max: 36.8 (14 Jun 2022 05:45)  T(F): 98 (14 Jun 2022 16:45), Max: 98.3 (14 Jun 2022 05:45)  HR: 74 (14 Jun 2022 16:45) (70 - 81)  BP: 162/99 (14 Jun 2022 16:45) (139/84 - 162/99)  BP(mean): 116 (14 Jun 2022 16:45) (116 - 116)  RR: 18 (14 Jun 2022 16:45) (17 - 18)  SpO2: 98% (14 Jun 2022 16:45) (97% - 100%)    PHYSICAL EXAM:  GENERAL: NAD, well-groomed, well-developed  HEAD:  Atraumatic, Normocephalic  EYES: EOMI, PERRLA, conjunctiva and sclera clear  ENMT: Moist mucous membranes, no lesions  NECK: Supple.  CHEST/LUNG: Clear to auscultation bilaterally; No rales, rhonchi, wheezing, or rubs  HEART: S1, S2.   ABDOMEN: Soft, Nontender, Nondistended; Bowel sounds present  EXTREMITIES:  2+ Peripheral Pulses, No clubbing, cyanosis, or edema  MS: No joint swelling or deformity.   LYMPH: No lymphadenopathy noted  SKIN: No rashes or lesions  NERVOUS SYSTEM:  No focal deficit.   PSYCH:  Awake and alert.   LABS:                         15.2   4.67  )-----------( 256      ( 13 Jun 2022 03:20 )             45.3     14 Jun 2022 06:00    136    |  100    |  13     ----------------------------<  222    4.8     |  28     |  1.06     Ca    9.0        14 Jun 2022 06:00    TPro  7.5    /  Alb  3.9    /  TBili  0.8    /  DBili  x      /  AST  16     /  ALT  24     /  AlkPhos  104    13 Jun 2022 03:20    CAPILLARY BLOOD GLUCOSE  POCT Blood Glucose.: 288 mg/dL (14 Jun 2022 11:39)  POCT Blood Glucose.: 196 mg/dL (14 Jun 2022 07:25)  POCT Blood Glucose.: 201 mg/dL (13 Jun 2022 22:35)  POCT Blood Glucose.: 284 mg/dL (13 Jun 2022 19:06)    PT/INR - ( 13 Jun 2022 03:20 )   PT: 11.2 sec;   INR: 0.97 ratio         PTT - ( 13 Jun 2022 03:20 )  PTT:28.1 sec    06-14 Chol 167 LDL -- HDL 40<L> Trig 85    RADIOLOGY TEST: (IMAGES REVIEWED BY ME)    Imaging Personally Reviewed:  [X] YES      HEALTH ISSUES - PROBLEM Dx:  Left leg weakness    Abdominal pain    Left hip pain    Type 2 diabetes mellitus    HTN (hypertension)    Hyperlipidemia    DM (diabetes mellitus)    Prophylactic measure    Uncontrolled type 2 diabetes mellitus with hyperglycemia         Patient is a 56y old  Male who presents with a chief complaint of Left leg weakness. (14 Jun 2022 16:01)    HPI: 56 years old male with past medical history of hypertension, diabetes mellitus, hyperlipidemia, who has been having some left leg weakness and gait abnormality for last many months.  Patient developed some lower abdominal pain few days ago and was seen at another facility.  Patient apparently had x-ray done and was advised that he had constipation.  Patient went back to work last night.  He had increasing weakness in his left leg and more pronounced footdrop.  He came into the emergency for the same.  Patient was evaluated and is being admitted to hospital as per ER physician for rule out CVA.    Patient continues to complain of left leg weakness and foot drop.  He also complains of left hip pain.  He also complained of some abdominal pain.  He denies any nausea or vomiting.  He does admit to some constipation.  Denies any fevers or chills.  Denies any dizziness or syncope. (13 Jun 2022 13:04)    INTERVAL HPI/OVERNIGHT EVENTS:  Chart reviewed, notes reviewed.   Patient seen and examined.  Being followed by following specialists: Neurology and cardiology.     Consultant(s) Notes Reviewed:  [X] Yes    Care Discussed with Consultants/Other Providers: [X] Yes    06/14/2022 --> (Patient seen with help of family member, who translated as per patient request) Still having some left leg weakness and foot drop. Abdominal pain has improved. No chest pain or pressure.     REVIEW OF SYSTEMS:  CONSTITUTIONAL: No fever, weight loss, or fatigue  EYES: No eye pain, or discharge  ENMT: No sinus or throat pain  NECK: No pain or stiffness  BREASTS: No pain, masses, or nipple discharge  RESPIRATORY: No cough, wheezing, chills or hemoptysis; No shortness of breath  CARDIOVASCULAR: No chest pain, palpitations, dizziness, or leg swelling  GASTROINTESTINAL: No abdominal or epigastric pain. No nausea, vomiting.  GENITOURINARY: No dysuria, frequency, hematuria, or incontinence  NEUROLOGICAL: + left leg weakness.   SKIN: No itching, burning, rashes, or lesions   LYMPH NODES: No enlarged glands  ENDOCRINE: No polydipsia or polyuria  MUSCULOSKELETAL: Left hip pain.   PSYCHIATRIC: No depression, anxiety, mood swings.  HEME/LYMPH: No easy bruising, or bleeding gums  ALLERGY AND IMMUNOLOGIC: No hives or eczema    Allergies: No Known Allergies    Intolerances      Home Medications:    MEDICATIONS  (STANDING):  aspirin enteric coated 81 milliGRAM(s) Oral daily  atorvastatin 80 milliGRAM(s) Oral at bedtime  dextrose 5%. 1000 milliLiter(s) (50 mL/Hr) IV Continuous <Continuous>  dextrose 5%. 1000 milliLiter(s) (100 mL/Hr) IV Continuous <Continuous>  dextrose 50% Injectable 25 Gram(s) IV Push once  dextrose 50% Injectable 12.5 Gram(s) IV Push once  dextrose 50% Injectable 25 Gram(s) IV Push once  enoxaparin Injectable 40 milliGRAM(s) SubCutaneous every 24 hours  glucagon  Injectable 1 milliGRAM(s) IntraMuscular once  glucagon  Injectable 1 milliGRAM(s) IntraMuscular once  influenza   Vaccine 0.5 milliLiter(s) IntraMuscular once  insulin glargine Injectable (LANTUS) 8 Unit(s) SubCutaneous at bedtime  insulin lispro (ADMELOG) corrective regimen sliding scale   SubCutaneous three times a day before meals  insulin lispro (ADMELOG) corrective regimen sliding scale   SubCutaneous at bedtime  insulin lispro Injectable (ADMELOG) 2 Unit(s) SubCutaneous three times a day before meals  iohexol 300 mG (iodine)/mL Oral Solution 30 milliLiter(s) Oral once  losartan 50 milliGRAM(s) Oral daily  polyethylene glycol 3350 17 Gram(s) Oral two times a day    MEDICATIONS  (PRN):  dextrose Oral Gel 15 Gram(s) Oral once PRN Blood Glucose LESS THAN 70 milliGRAM(s)/deciliter    Vital Signs Last 24 Hrs  T(C): 36.7 (14 Jun 2022 16:45), Max: 36.8 (14 Jun 2022 05:45)  T(F): 98 (14 Jun 2022 16:45), Max: 98.3 (14 Jun 2022 05:45)  HR: 74 (14 Jun 2022 16:45) (70 - 81)  BP: 162/99 (14 Jun 2022 16:45) (139/84 - 162/99)  BP(mean): 116 (14 Jun 2022 16:45) (116 - 116)  RR: 18 (14 Jun 2022 16:45) (17 - 18)  SpO2: 98% (14 Jun 2022 16:45) (97% - 100%)    PHYSICAL EXAM:  GENERAL: NAD, well-groomed, well-developed  HEAD:  Atraumatic, Normocephalic  EYES: EOMI, PERRLA, conjunctiva and sclera clear  ENMT: Moist mucous membranes, no lesions  NECK: Supple.  CHEST/LUNG: Clear to auscultation bilaterally; No rales, rhonchi, wheezing, or rubs  HEART: S1, S2.   ABDOMEN: Soft, Nontender, Nondistended; Bowel sounds present  EXTREMITIES:  2+ Peripheral Pulses, No clubbing, cyanosis, or edema  MS: No joint swelling or deformity.   LYMPH: No lymphadenopathy noted  SKIN: No rashes or lesions  NERVOUS SYSTEM:  Left leg weakness.   PSYCH:  Awake and alert.   LABS:                         15.2   4.67  )-----------( 256      ( 13 Jun 2022 03:20 )             45.3     14 Jun 2022 06:00    136    |  100    |  13     ----------------------------<  222    4.8     |  28     |  1.06     Ca    9.0        14 Jun 2022 06:00    TPro  7.5    /  Alb  3.9    /  TBili  0.8    /  DBili  x      /  AST  16     /  ALT  24     /  AlkPhos  104    13 Jun 2022 03:20    CAPILLARY BLOOD GLUCOSE  POCT Blood Glucose.: 288 mg/dL (14 Jun 2022 11:39)  POCT Blood Glucose.: 196 mg/dL (14 Jun 2022 07:25)  POCT Blood Glucose.: 201 mg/dL (13 Jun 2022 22:35)  POCT Blood Glucose.: 284 mg/dL (13 Jun 2022 19:06)    PT/INR - ( 13 Jun 2022 03:20 )   PT: 11.2 sec;   INR: 0.97 ratio         PTT - ( 13 Jun 2022 03:20 )  PTT:28.1 sec    06-14 Chol 167 LDL -- HDL 40<L> Trig 85    RADIOLOGY TEST: (IMAGES REVIEWED BY ME)  < from: Xray Abdomen 2 Views (06.13.22 @ 14:34) >  ACC: [id number] EXAM:  XR ABDOMEN 2V                          PROCEDURE DATE:  06/13/2022          INTERPRETATION:  KUB: AP and upright    COMPARISON: None.    CLINICAL INFORMATION: Abdominal pain.    FINDINGS:  Grossly  fluid-filled distended stomach concerning gastric outlet   obstruction.  Displaced nonobstructed colon and small bowel. An  distal bowel  .  No free intra-abdominal air.  No abnormal calcifications.  The osseous structures are intact.    IMPRESSION: Marked fluid distention of stomach measuring approximately 14   cm in diameter displacing air-filled bowel.  Cannot exclude gastric outlet obstruction or stenosis    < end of copied text >    < from: US Transthoracic Echocardiogram w/Doppler Complete (06.13.22 @ 14:33) >  ACC: [id number] EXAM:  US TTE W DOPPLER COMPLETE                          PROCEDURE DATE:  06/13/2022          INTERPRETATION:  Ordering Physician: MEETA LEIVA [id number]    Indication: Cerebrovascular accident    Technician: Shantell Balderrama    Study Quality: Suboptimal    Height: 5 feet 5 inches  Weight: 149 pounds  Blood Pressure: 138/83    MEASUREMENTS  IVS: 1.2 cm  PWT: 1.2 cm  LA: 3.3 cm  Aortic Root: 2.9 cm  LVIDd: 2.9 cm  LVIDs: 2.1 cm    LVEF: Approximately 70%    FINDINGS  Left Ventricle: Normal left ventricular systolic function with estimated   ejection fraction 70%. Mild diastolic dysfunction (stage I). Mild,   concentric left ventricular hypertrophy.  Right Ventricle: Normal right ventricular size and function.  Left Atrium: Normal left atrium.  Right Atrium: Normal right atrium.  Mitral Valve: Normal mitral valve.  Aortic Valve: Normal, trileaflet aortic valve.  Tricuspid Valve: Normal tricuspid valve.  Pulmonic Valve: Normal pulmonic valve.  Pericardium/Pleura: No pericardial effusion.      IMPRESSION:  1. Normal left ventricular systolic function with estimated ejection   fraction 70%.  2. Mild diastolic dysfunction (stage I). Mild, concentric left   ventricular hypertrophy.  3. Mild, concentric left ventricular hypertrophy.  4. No cardiac source of embolism identified on this transthoracic study.    --- End of Report ---    < end of copied text >  < from: Xray Hip w/ Pelvis 2 or 3 Views, Left (06.13.22 @ 14:33) >  ACC: [id number] EXAM:  XR HIP WITH PELV 2-3V LT                          PROCEDURE DATE:  06/13/2022          INTERPRETATION:  Radiographs of the LEFT hip    CLINICAL INFORMATION:  LEFT hip   Pain.    TECHNIQUE:   AP and oblique views of the hip. AP pelvis view.    FINDINGS:   No prior exams are available for comparison.    No fracture or dislocation..  The hip remains located..  The joint space is preserved.  No significant productive changes or other cortical/ trabecular   abnormalities..    Nosoft tissue abnormality.  Osseous pelvis intact.    IMPRESSION:    No acute radiographic osseous pathology..    --- End of Report ---    < end of copied text >  < from: CT Abdomen and Pelvis w/ Oral Cont (06.14.22 @ 01:04) >  ACC: [id number] EXAM:  CT ABDOMEN AND PELVIS OC                          PROCEDURE DATE:  06/14/2022          INTERPRETATION:  CLINICAL INFORMATION: 56-year-old male patient with   Abnormal abdominal x-ray. Evaluate for gastric outlet obstruction.    COMPARISON: Abdominal x-ray from 6/13/2022    CONTRAST/COMPLICATIONS:  IV Contrast: None  Oral Contrast: Omnipaque 300   Fruit 2o  Complications: None reported at time of study completion    PROCEDURE:  CT of the Abdomen and Pelvis was performed.  Sagittal and coronal reformats were performed.    FINDINGS:  LOWER CHEST: There is dependent hypoventilation bilaterally. There is a 2   mm calcified nodule at the left lower lobe and a 2 mm calcified nodule at   the right lower lobe (series 3 image 4).Partially calcified mediastinal   and right hilar lymph nodes.    LIVER: Within normal limits.  BILE DUCTS: Normal caliber.  GALLBLADDER: Within normal limits.  SPLEEN: Within normal limits.  PANCREAS: Within normal limits.  ADRENALS: Within normal limits.  KIDNEYS/URETERS: Within normal limits.    BLADDER: Within normal limits.  REPRODUCTIVE ORGANS: Prostate is enlarged, measuring 5.1 cm in the   transverse dimension.    BOWEL: Distal esophagus is not dilated. This is not fully distended   stomach contains an moderate amount of oral contrast. There is suggestion   of asymmetric wall thickening along the greater curvature of the normal   appearance of the antrum, pylorus and adhesions bowel. No evidence of   mass or over distention of the stomach to suspect outlet obstruction. No   bowel obstruction or inflammation. The appendix is normal. Moderate   colonic stool burden.  PERITONEUM: No ascites.  VESSELS: Within normal limits.  RETROPERITONEUM/LYMPH NODES: No lymphadenopathy.  ABDOMINAL WALL: Within normal limits.  BONES: There are degenerative changes of the visualized spine.    IMPRESSION:  No evidence of gastric outlet obstruction. Underdistended stomach with   asymmetric thickening of the gastric wall along the greater curvature   which likely is due to partial decompression. Recommend endoscopy to rule   out underlying mucosal abnormality.    No bowel obstruction.    Prostatomegaly.        --- End of Report ---      < end of copied text >    Imaging Personally Reviewed:  [X] YES      HEALTH ISSUES - PROBLEM Dx:  Left leg weakness    Abdominal pain    Left hip pain    Type 2 diabetes mellitus    HTN (hypertension)    Hyperlipidemia    DM (diabetes mellitus)    Prophylactic measure    Uncontrolled type 2 diabetes mellitus with hyperglycemia

## 2022-06-14 NOTE — OCCUPATIONAL THERAPY INITIAL EVALUATION ADULT - DIAGNOSIS, OT EVAL
Pt with impaired motor control, strength, balance impacting pt's ability to complete ADLs, IADLs, functional mobility/transfers.

## 2022-06-14 NOTE — PHYSICAL THERAPY INITIAL EVALUATION ADULT - ACTIVE RANGE OF MOTION EXAMINATION, REHAB EVAL
left hip knee ankle WNL to PROM. decreased Active dorsiflexion.hip flexion, knee extension./dawna. upper extremity Active ROM was WNL (within normal limits)/RLE Active ROM was WNL (within normal limits)/deficits as listed below

## 2022-06-14 NOTE — CONSULT NOTE ADULT - CONSULT REASON
Hypertension, possible CVA
.
56y A1C with Estimated Average Glucose Result: 11.6 % (06-14-22 @ 06:00)   diabetes mellitus uncontrolled type 2

## 2022-06-14 NOTE — CONSULT NOTE ADULT - SUBJECTIVE AND OBJECTIVE BOX
History of Present Illness:    Past Medical/Surgical History:    Medications:    Family History: Non-contributory family history of premature cardiovascular atherosclerotic disease    Social History: No tobacco, alcohol or drug use    Review of Systems:  General: No fevers, chills, weight gain  Skin: No rashes, color changes  Cardiovascular: No chest pain, orthopnea  Respiratory: No shortness of breath, cough  Gastrointestinal: No nausea, abdominal pain  Genitourinary: No incontinence, pain with urination  Musculoskeletal: No pain, swelling, decreased range of motion  Neurological: No headache, weakness  Psychiatric: No depression, anxiety  Endocrine: No weight gain, increased thirst  All other systems are comprehensively negative.    Physical Exam:  Vitals:        Vital Signs Last 24 Hrs  T(C): 36.8 (14 Jun 2022 05:45), Max: 36.8 (13 Jun 2022 12:30)  T(F): 98.3 (14 Jun 2022 05:45), Max: 98.3 (13 Jun 2022 12:30)  HR: 81 (14 Jun 2022 05:45) (69 - 83)  BP: 139/84 (14 Jun 2022 05:45) (135/89 - 161/92)  BP(mean): --  RR: 17 (14 Jun 2022 05:45) (15 - 17)  SpO2: 100% (14 Jun 2022 05:45) (97% - 100%)  General: NAD  HEENT: MMM  Neck: No JVD, no carotid bruit  Lungs: CTAB  CV: RRR, nl S1/S2, no M/R/G  Abdomen: S/NT/ND, +BS  Extremities: No LE edema, no cyanosis  Neuro: AAOx3, non-focal  Skin: No rash    Labs:                        15.2   4.67  )-----------( 256      ( 13 Jun 2022 03:20 )             45.3     06-14    136  |  100  |  13  ----------------------------<  222<H>  4.8   |  28  |  1.06    Ca    9.0      14 Jun 2022 06:00    TPro  7.5  /  Alb  3.9  /  TBili  0.8  /  DBili  x   /  AST  16  /  ALT  24  /  AlkPhos  104  06-13        PT/INR - ( 13 Jun 2022 03:20 )   PT: 11.2 sec;   INR: 0.97 ratio         PTT - ( 13 Jun 2022 03:20 )  PTT:28.1 sec    ECG: NSR, normal axis, no ST abnormality     History of Present Illness: The patient is a 56 year old male with a history of HTN, HL, DM who presented with left leg weakness and difficulty walking. He denies chest pain, shortness of breath, palpitations, visual or speech changes. No prior cardiac issues or testing.    Past Medical/Surgical History:  HTN, HL, DM    Medications:  Unknown    Family History: Non-contributory family history of premature cardiovascular atherosclerotic disease    Social History: No tobacco, alcohol or drug use    Review of Systems:  General: No fevers, chills, weight gain  Skin: No rashes, color changes  Cardiovascular: No chest pain, orthopnea  Respiratory: No shortness of breath, cough  Gastrointestinal: No nausea, abdominal pain  Genitourinary: No incontinence, pain with urination  Musculoskeletal: No pain, swelling, decreased range of motion  Neurological: No headache, +weakness  Psychiatric: No depression, anxiety  Endocrine: No weight gain, increased thirst  All other systems are comprehensively negative.    Physical Exam:  Vitals:        Vital Signs Last 24 Hrs  T(C): 36.8 (14 Jun 2022 05:45), Max: 36.8 (13 Jun 2022 12:30)  T(F): 98.3 (14 Jun 2022 05:45), Max: 98.3 (13 Jun 2022 12:30)  HR: 81 (14 Jun 2022 05:45) (69 - 83)  BP: 139/84 (14 Jun 2022 05:45) (135/89 - 161/92)  BP(mean): --  RR: 17 (14 Jun 2022 05:45) (15 - 17)  SpO2: 100% (14 Jun 2022 05:45) (97% - 100%)  General: NAD  HEENT: MMM  Neck: No JVD, no carotid bruit  Lungs: CTAB  CV: RRR, nl S1/S2, no M/R/G  Abdomen: S/NT/ND, +BS  Extremities: No LE edema, no cyanosis  Neuro: AAOx3, non-focal  Skin: No rash    Labs:                        15.2   4.67  )-----------( 256      ( 13 Jun 2022 03:20 )             45.3     06-14    136  |  100  |  13  ----------------------------<  222<H>  4.8   |  28  |  1.06    Ca    9.0      14 Jun 2022 06:00    TPro  7.5  /  Alb  3.9  /  TBili  0.8  /  DBili  x   /  AST  16  /  ALT  24  /  AlkPhos  104  06-13        PT/INR - ( 13 Jun 2022 03:20 )   PT: 11.2 sec;   INR: 0.97 ratio         PTT - ( 13 Jun 2022 03:20 )  PTT:28.1 sec    ECG: NSR, normal axis, no ST abnormality    Telemetry: Sinus rhythm

## 2022-06-14 NOTE — CONSULT NOTE ADULT - SUBJECTIVE AND OBJECTIVE BOX
Patient is a 56y old  Male who presents with a chief complaint of Left leg weakness. (14 Jun 2022 11:26)    pt alert and oriented, interview conducted w/ pacific interpreters [place]  [place] ID [id number]  Type 2 DM DX 5 years ago, pt reports lethargy, no polyuria/polydipsia, no hx DKA/HSS, has not seen Endocrine, managed by PCP Clifton Mancilla, Last seen June 4, 2022. pt reports Rx home Metformin daily and Januvia 1 daily, unsure of dosages, states he does not take regularly, has Glucometer @ home states he checks every morning, gets readings 70-80, 120 after eating, told patient to bring monitor in to review and troubleshoot. pt states he has meds and testing supplies @ home. BMI 25, pt does not follow consistent carb diet, unaware of what constitutes carbs vs proteins, states he eats a lot of fish, Citizen of [place] banana dishes, breadfruit. states he is very physically active  diabetes education provided- A1c measure and BG targets , medication MOA and contraindications, in-hospital BGM frequency and insulin administration, BGM @ home and troubleshooting, consistent carb diet and meal planning, identifying carbohydrate foods, risks/ complications of high blood sugars, s/s of hyperglycemia/hypoglycemia and management, provider f/u  Hx: HLD    HPI:  56 years old male with past medical history of hypertension, diabetes mellitus, hyperlipidemia, who has been having some left leg weakness and gait abnormality for last many months.  Patient developed some lower abdominal pain few days ago and was seen at another facility.  Patient apparently had x-ray done and was advised that he had constipation.  Patient went back to work last night.  He had increasing weakness in his left leg and more pronounced footdrop.  He came into the emergency for the same.  Patient was evaluated and is being admitted to hospital as per ER physician for rule out CVA.    Patient continues to complain of left leg weakness and foot drop.  He also complains of left hip pain.  He also complained of some abdominal pain.  He denies any nausea or vomiting.  He does admit to some constipation.  Denies any fevers or chills.  Denies any dizziness or syncope. (13 Jun 2022 13:04)      PAST MEDICAL & SURGICAL HISTORY:  HTN (hypertension)      DM (diabetes mellitus)      Hyperlipidemia          REVIEW OF SYSTEMS  General: alert and oriented  Respiratory: NAD, No SOB, no cough  Cardiovascular: No chest pain, no palpitations	      Allergies    No Known Allergies    Intolerances        MEDICATIONS  (STANDING):  aspirin enteric coated 81 milliGRAM(s) Oral daily  atorvastatin 80 milliGRAM(s) Oral at bedtime  dextrose 5%. 1000 milliLiter(s) (50 mL/Hr) IV Continuous <Continuous>  dextrose 5%. 1000 milliLiter(s) (100 mL/Hr) IV Continuous <Continuous>  dextrose 50% Injectable 25 Gram(s) IV Push once  dextrose 50% Injectable 12.5 Gram(s) IV Push once  dextrose 50% Injectable 25 Gram(s) IV Push once  enoxaparin Injectable 40 milliGRAM(s) SubCutaneous every 24 hours  glucagon  Injectable 1 milliGRAM(s) IntraMuscular once  glucagon  Injectable 1 milliGRAM(s) IntraMuscular once  influenza   Vaccine 0.5 milliLiter(s) IntraMuscular once  insulin glargine Injectable (LANTUS) 8 Unit(s) SubCutaneous at bedtime  insulin lispro (ADMELOG) corrective regimen sliding scale   SubCutaneous three times a day before meals  insulin lispro (ADMELOG) corrective regimen sliding scale   SubCutaneous at bedtime  insulin lispro Injectable (ADMELOG) 2 Unit(s) SubCutaneous three times a day before meals  iohexol 300 mG (iodine)/mL Oral Solution 30 milliLiter(s) Oral once  losartan 50 milliGRAM(s) Oral daily  polyethylene glycol 3350 17 Gram(s) Oral two times a day

## 2022-06-14 NOTE — SWALLOW BEDSIDE ASSESSMENT ADULT - COMMENTS
Pt received upright in bed, awake and cooperative, on room air. Pt was agreeable to assessment. Pt followed simple commands independently. Pt's vocal quality/intensity and speech production was WFL. Pt denied pain pre and post assessment.    CT head 6/13: "No acute intracranial hemorrhage or mass effect."  No CXR has been administered this admission. Pt's WBC is WFL, no fever.

## 2022-06-14 NOTE — OCCUPATIONAL THERAPY INITIAL EVALUATION ADULT - LIVES WITH, PROFILE
Pt lives with his family in a private home, reports 0 steps to enter, 5 steps inside with a tub. Pt was independent with ADLs, IADLs, functional mobility/transfers prior to admission without AD.
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
## 2022-06-14 NOTE — PHYSICAL THERAPY INITIAL EVALUATION ADULT - MANUAL MUSCLE TESTING RESULTS, REHAB EVAL
BIlat UE and right LE 5/5 grossly. Left LE hip flexors 3/5,knee extensors 4/5, dorsiflexion 1/5/grossly assessed due to

## 2022-06-14 NOTE — SWALLOW BEDSIDE ASSESSMENT ADULT - SWALLOW EVAL: DIAGNOSIS
Pt self-administered PO trials of thin liquids via single/consecutive cups sips and regular solids. Pt p/w a functional oral phase marked by adequate retrieval and containment, timely mastication/manipulation and transfer, and adequate clearance post swallow. Pharyngeal phase marked by suspected timely swallow onset, +laryngeal elevation to palpation, and no overt s/sx of aspiration. Recommend pt resume current diet of regular solids with thin liquids +aspiration precautions.

## 2022-06-14 NOTE — SWALLOW BEDSIDE ASSESSMENT ADULT - ASR SWALLOW RECOMMEND DIAG
no CXR has been administered this admission- clinically, pt appeared to tolerate PO and no clinical indications for pna at this time; therefore, MBS not warranted at this time

## 2022-06-14 NOTE — SWALLOW BEDSIDE ASSESSMENT ADULT - SLP PERTINENT HISTORY OF CURRENT PROBLEM
Per charting, "56 years old male with past medical history of hypertension, diabetes mellitus, hyperlipidemia, who has been having some left leg weakness and gait abnormality for last many months.  Patient developed some lower abdominal pain few days ago and was seen at another facility.  Patient apparently had x-ray done and was advised that he had constipation.  Patient went back to work last night.  He had increasing weakness in his left leg and more pronounced footdrop.  He came into the emergency for the same.  Patient was evaluated and is being admitted to hospital as per ER physician for rule out CVA."

## 2022-06-15 ENCOUNTER — TRANSCRIPTION ENCOUNTER (OUTPATIENT)
Age: 57
End: 2022-06-15

## 2022-06-15 VITALS
TEMPERATURE: 98 F | RESPIRATION RATE: 18 BRPM | DIASTOLIC BLOOD PRESSURE: 97 MMHG | SYSTOLIC BLOOD PRESSURE: 142 MMHG | HEART RATE: 89 BPM | OXYGEN SATURATION: 100 %

## 2022-06-15 LAB
ANION GAP SERPL CALC-SCNC: 6 MMOL/L — SIGNIFICANT CHANGE UP (ref 5–17)
BUN SERPL-MCNC: 13 MG/DL — SIGNIFICANT CHANGE UP (ref 7–23)
CALCIUM SERPL-MCNC: 9.6 MG/DL — SIGNIFICANT CHANGE UP (ref 8.4–10.5)
CHLORIDE SERPL-SCNC: 98 MMOL/L — SIGNIFICANT CHANGE UP (ref 96–108)
CO2 SERPL-SCNC: 31 MMOL/L — SIGNIFICANT CHANGE UP (ref 22–31)
CREAT SERPL-MCNC: 1.06 MG/DL — SIGNIFICANT CHANGE UP (ref 0.5–1.3)
EGFR: 82 ML/MIN/1.73M2 — SIGNIFICANT CHANGE UP
GLUCOSE BLDC GLUCOMTR-MCNC: 208 MG/DL — HIGH (ref 70–99)
GLUCOSE BLDC GLUCOMTR-MCNC: 253 MG/DL — HIGH (ref 70–99)
GLUCOSE SERPL-MCNC: 222 MG/DL — HIGH (ref 70–99)
HCT VFR BLD CALC: 44.2 % — SIGNIFICANT CHANGE UP (ref 39–50)
HGB BLD-MCNC: 15.3 G/DL — SIGNIFICANT CHANGE UP (ref 13–17)
MCHC RBC-ENTMCNC: 26.3 PG — LOW (ref 27–34)
MCHC RBC-ENTMCNC: 34.6 GM/DL — SIGNIFICANT CHANGE UP (ref 32–36)
MCV RBC AUTO: 75.9 FL — LOW (ref 80–100)
NRBC # BLD: 0 /100 WBCS — SIGNIFICANT CHANGE UP (ref 0–0)
PLATELET # BLD AUTO: 260 K/UL — SIGNIFICANT CHANGE UP (ref 150–400)
POTASSIUM SERPL-MCNC: 4.3 MMOL/L — SIGNIFICANT CHANGE UP (ref 3.5–5.3)
POTASSIUM SERPL-SCNC: 4.3 MMOL/L — SIGNIFICANT CHANGE UP (ref 3.5–5.3)
RBC # BLD: 5.82 M/UL — HIGH (ref 4.2–5.8)
RBC # FLD: 13 % — SIGNIFICANT CHANGE UP (ref 10.3–14.5)
SODIUM SERPL-SCNC: 135 MMOL/L — SIGNIFICANT CHANGE UP (ref 135–145)
WBC # BLD: 6.19 K/UL — SIGNIFICANT CHANGE UP (ref 3.8–10.5)
WBC # FLD AUTO: 6.19 K/UL — SIGNIFICANT CHANGE UP (ref 3.8–10.5)

## 2022-06-15 PROCEDURE — 93005 ELECTROCARDIOGRAM TRACING: CPT

## 2022-06-15 PROCEDURE — 82962 GLUCOSE BLOOD TEST: CPT

## 2022-06-15 PROCEDURE — 73700 CT LOWER EXTREMITY W/O DYE: CPT

## 2022-06-15 PROCEDURE — 85730 THROMBOPLASTIN TIME PARTIAL: CPT

## 2022-06-15 PROCEDURE — 80053 COMPREHEN METABOLIC PANEL: CPT

## 2022-06-15 PROCEDURE — 73090 X-RAY EXAM OF FOREARM: CPT

## 2022-06-15 PROCEDURE — 70496 CT ANGIOGRAPHY HEAD: CPT | Mod: MA

## 2022-06-15 PROCEDURE — 99285 EMERGENCY DEPT VISIT HI MDM: CPT | Mod: 25

## 2022-06-15 PROCEDURE — 70450 CT HEAD/BRAIN W/O DYE: CPT

## 2022-06-15 PROCEDURE — 85027 COMPLETE CBC AUTOMATED: CPT

## 2022-06-15 PROCEDURE — 84484 ASSAY OF TROPONIN QUANT: CPT

## 2022-06-15 PROCEDURE — 74019 RADEX ABDOMEN 2 VIEWS: CPT

## 2022-06-15 PROCEDURE — 87635 SARS-COV-2 COVID-19 AMP PRB: CPT

## 2022-06-15 PROCEDURE — 97112 NEUROMUSCULAR REEDUCATION: CPT

## 2022-06-15 PROCEDURE — 85610 PROTHROMBIN TIME: CPT

## 2022-06-15 PROCEDURE — 97530 THERAPEUTIC ACTIVITIES: CPT

## 2022-06-15 PROCEDURE — 97161 PT EVAL LOW COMPLEX 20 MIN: CPT

## 2022-06-15 PROCEDURE — 70498 CT ANGIOGRAPHY NECK: CPT | Mod: MA

## 2022-06-15 PROCEDURE — 86803 HEPATITIS C AB TEST: CPT

## 2022-06-15 PROCEDURE — 80048 BASIC METABOLIC PNL TOTAL CA: CPT

## 2022-06-15 PROCEDURE — 74176 CT ABD & PELVIS W/O CONTRAST: CPT

## 2022-06-15 PROCEDURE — 97165 OT EVAL LOW COMPLEX 30 MIN: CPT

## 2022-06-15 PROCEDURE — 83036 HEMOGLOBIN GLYCOSYLATED A1C: CPT

## 2022-06-15 PROCEDURE — 92610 EVALUATE SWALLOWING FUNCTION: CPT

## 2022-06-15 PROCEDURE — 36415 COLL VENOUS BLD VENIPUNCTURE: CPT

## 2022-06-15 PROCEDURE — 97116 GAIT TRAINING THERAPY: CPT

## 2022-06-15 PROCEDURE — 73502 X-RAY EXAM HIP UNI 2-3 VIEWS: CPT

## 2022-06-15 PROCEDURE — 93306 TTE W/DOPPLER COMPLETE: CPT

## 2022-06-15 PROCEDURE — 72131 CT LUMBAR SPINE W/O DYE: CPT

## 2022-06-15 PROCEDURE — 80061 LIPID PANEL: CPT

## 2022-06-15 PROCEDURE — 85025 COMPLETE CBC W/AUTO DIFF WBC: CPT

## 2022-06-15 RX ORDER — LOSARTAN POTASSIUM 100 MG/1
100 TABLET, FILM COATED ORAL DAILY
Refills: 0 | Status: DISCONTINUED | OUTPATIENT
Start: 2022-06-16 | End: 2022-06-15

## 2022-06-15 RX ORDER — METFORMIN HYDROCHLORIDE 850 MG/1
1 TABLET ORAL
Qty: 60 | Refills: 0
Start: 2022-06-15 | End: 2022-07-14

## 2022-06-15 RX ORDER — ATORVASTATIN CALCIUM 80 MG/1
1 TABLET, FILM COATED ORAL
Qty: 30 | Refills: 0
Start: 2022-06-15 | End: 2022-07-14

## 2022-06-15 RX ORDER — ASPIRIN/CALCIUM CARB/MAGNESIUM 324 MG
1 TABLET ORAL
Qty: 0 | Refills: 0 | DISCHARGE
Start: 2022-06-15

## 2022-06-15 RX ORDER — SITAGLIPTIN 50 MG/1
1 TABLET, FILM COATED ORAL
Qty: 30 | Refills: 0
Start: 2022-06-15 | End: 2022-07-14

## 2022-06-15 RX ORDER — LOSARTAN POTASSIUM 100 MG/1
50 TABLET, FILM COATED ORAL ONCE
Refills: 0 | Status: COMPLETED | OUTPATIENT
Start: 2022-06-15 | End: 2022-06-15

## 2022-06-15 RX ORDER — LOSARTAN POTASSIUM 100 MG/1
1 TABLET, FILM COATED ORAL
Qty: 30 | Refills: 0
Start: 2022-06-15 | End: 2022-07-14

## 2022-06-15 RX ADMIN — Medication 81 MILLIGRAM(S): at 12:08

## 2022-06-15 RX ADMIN — Medication 2 UNIT(S): at 07:39

## 2022-06-15 RX ADMIN — Medication 2 UNIT(S): at 12:09

## 2022-06-15 RX ADMIN — ENOXAPARIN SODIUM 40 MILLIGRAM(S): 100 INJECTION SUBCUTANEOUS at 12:10

## 2022-06-15 RX ADMIN — Medication 6: at 07:39

## 2022-06-15 RX ADMIN — LOSARTAN POTASSIUM 50 MILLIGRAM(S): 100 TABLET, FILM COATED ORAL at 06:45

## 2022-06-15 RX ADMIN — POLYETHYLENE GLYCOL 3350 17 GRAM(S): 17 POWDER, FOR SOLUTION ORAL at 06:45

## 2022-06-15 RX ADMIN — Medication 4: at 12:09

## 2022-06-15 RX ADMIN — LOSARTAN POTASSIUM 50 MILLIGRAM(S): 100 TABLET, FILM COATED ORAL at 12:08
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
## 2022-06-15 NOTE — DISCHARGE NOTE NURSING/CASE MANAGEMENT/SOCIAL WORK - NSDPLANGINTID_GEN_ALL_CORE
Q2turn with pillow support. Frequent checks. Patient incont. Daily wound care dressing intact. Will continue to monitor. [id number]

## 2022-06-15 NOTE — PROGRESS NOTE ADULT - PROBLEM SELECTOR PLAN 4
resume out patient medications on discharge.   Out patient follow up with Endocrinology or PMD for optimization of his regimen.
Patient with uncontrolled type 2 diabetes mellitus.   Will place on Lantus and pre meal Admelog.   Moderate Admelog sliding scale coverage.   Endocrinology consult called.

## 2022-06-15 NOTE — DISCHARGE NOTE PROVIDER - CARE PROVIDERS DIRECT ADDRESSES
,[email or web address].[employer]net,DirectAddress_Unknown,[email or web address].[employer]

## 2022-06-15 NOTE — PROGRESS NOTE ADULT - SUBJECTIVE AND OBJECTIVE BOX
Chief Complaint: Weakness, difficulty walking    Interval Events: No events overnight.    Review of Systems:  General: No fevers, chills, weight gain  Skin: No rashes, color changes  Cardiovascular: No chest pain, orthopnea  Respiratory: No shortness of breath, cough  Gastrointestinal: No nausea, abdominal pain  Genitourinary: No incontinence, pain with urination  Musculoskeletal: No pain, swelling, decreased range of motion  Neurological: No headache, weakness  Psychiatric: No depression, anxiety  Endocrine: No weight gain, increased thirst  All other systems are comprehensively negative.    Physical Exam:  Vitals:        Vital Signs Last 24 Hrs  T(C): 36.5 (15 Deonte 2022 04:00), Max: 36.7 (14 Jun 2022 11:15)  T(F): 97.7 (15 Deonte 2022 04:00), Max: 98.1 (14 Jun 2022 11:15)  HR: 85 (15 Deonte 2022 08:00) (70 - 92)  BP: 147/90 (15 Deonte 2022 08:00) (108/76 - 162/99)  BP(mean): 107 (15 Deonte 2022 08:00) (86 - 116)  RR: 18 (15 Deonte 2022 08:00) (10 - 20)  SpO2: 97% (15 Deonte 2022 08:00) (97% - 100%)  General: NAD  HEENT: MMM  Neck: No JVD, no carotid bruit  Lungs: CTAB  CV: RRR, nl S1/S2, no M/R/G  Abdomen: S/NT/ND, +BS  Extremities: No LE edema, no cyanosis  Neuro: AAOx3, non-focal  Skin: No rash    Labs:                        15.3   6.19  )-----------( 260      ( 15 Deonte 2022 06:00 )             44.2     06-15    135  |  98  |  13  ----------------------------<  222<H>  4.3   |  31  |  1.06    Ca    9.6      15 Deonte 2022 06:00              Telemetry: Sinus rhythm

## 2022-06-15 NOTE — PROGRESS NOTE ADULT - SUBJECTIVE AND OBJECTIVE BOX
Patient is a 56y old  Male who presents with a chief complaint of Left leg weakness. (14 Jun 2022 16:01)    HPI: 56 years old male with past medical history of hypertension, diabetes mellitus, hyperlipidemia, who has been having some left leg weakness and gait abnormality for last many months.  Patient developed some lower abdominal pain few days ago and was seen at another facility.  Patient apparently had x-ray done and was advised that he had constipation.  Patient went back to work last night.  He had increasing weakness in his left leg and more pronounced footdrop.  He came into the emergency for the same.  Patient was evaluated and is being admitted to hospital as per ER physician for rule out CVA.    Patient continues to complain of left leg weakness and foot drop.  He also complains of left hip pain.  He also complained of some abdominal pain.  He denies any nausea or vomiting.  He does admit to some constipation.  Denies any fevers or chills.  Denies any dizziness or syncope. (13 Jun 2022 13:04)    INTERVAL HPI/OVERNIGHT EVENTS:  Chart reviewed, notes reviewed.   Patient seen and examined.  Being followed by following specialists: Neurology and cardiology.     Consultant(s) Notes Reviewed:  [X] Yes    Care Discussed with Consultants/Other Providers: [X] Yes    06/14/2022 --> (Patient seen with help of family member, who translated as per patient request) Still having some left leg weakness and foot drop. Abdominal pain has improved. No chest pain or pressure.     06/15/2022 --> ( patient seen with help of  Nikki # [id number]). Patient still c/o left leg weakness. No chest pain or pressure. No nausea or vomiting.     REVIEW OF SYSTEMS:  CONSTITUTIONAL: No fever, weight loss, or fatigue  EYES: No eye pain, or discharge  ENMT: No sinus or throat pain  NECK: No pain or stiffness  BREASTS: No pain, masses, or nipple discharge  RESPIRATORY: No cough, wheezing, chills or hemoptysis; No shortness of breath  CARDIOVASCULAR: No chest pain, palpitations, dizziness, or leg swelling  GASTROINTESTINAL: No abdominal or epigastric pain. No nausea, vomiting.  GENITOURINARY: No dysuria, frequency, hematuria, or incontinence  NEUROLOGICAL: + left leg weakness.   SKIN: No itching, burning, rashes, or lesions   LYMPH NODES: No enlarged glands  ENDOCRINE: No polydipsia or polyuria  MUSCULOSKELETAL: Left hip pain.   PSYCHIATRIC: No depression, anxiety, mood swings.  HEME/LYMPH: No easy bruising, or bleeding gums  ALLERGY AND IMMUNOLOGIC: No hives or eczema    Allergies: No Known Allergies    Intolerances      Home Medications:    MEDICATIONS  (STANDING):  aspirin enteric coated 81 milliGRAM(s) Oral daily  atorvastatin 80 milliGRAM(s) Oral at bedtime  dextrose 5%. 1000 milliLiter(s) (50 mL/Hr) IV Continuous <Continuous>  dextrose 5%. 1000 milliLiter(s) (100 mL/Hr) IV Continuous <Continuous>  dextrose 50% Injectable 25 Gram(s) IV Push once  dextrose 50% Injectable 12.5 Gram(s) IV Push once  dextrose 50% Injectable 25 Gram(s) IV Push once  enoxaparin Injectable 40 milliGRAM(s) SubCutaneous every 24 hours  glucagon  Injectable 1 milliGRAM(s) IntraMuscular once  glucagon  Injectable 1 milliGRAM(s) IntraMuscular once  influenza   Vaccine 0.5 milliLiter(s) IntraMuscular once  insulin glargine Injectable (LANTUS) 8 Unit(s) SubCutaneous at bedtime  insulin lispro (ADMELOG) corrective regimen sliding scale   SubCutaneous three times a day before meals  insulin lispro (ADMELOG) corrective regimen sliding scale   SubCutaneous at bedtime  insulin lispro Injectable (ADMELOG) 2 Unit(s) SubCutaneous three times a day before meals  iohexol 300 mG (iodine)/mL Oral Solution 30 milliLiter(s) Oral once  losartan 50 milliGRAM(s) Oral daily  polyethylene glycol 3350 17 Gram(s) Oral two times a day    MEDICATIONS  (PRN):  dextrose Oral Gel 15 Gram(s) Oral once PRN Blood Glucose LESS THAN 70 milliGRAM(s)/deciliter    Vital Signs Last 24 Hrs  T(C): 36.7 (15 Deonte 2022 12:11), Max: 36.7 (14 Jun 2022 16:45)  T(F): 98 (15 Deonte 2022 12:11), Max: 98.1 (15 Deonte 2022 08:30)  HR: 79 (15 Deonte 2022 12:00) (70 - 92)  BP: 128/75 (15 Deonte 2022 12:00) (108/76 - 162/99)  BP(mean): 91 (15 Deonte 2022 12:00) (86 - 116)  RR: 19 (15 Deonte 2022 12:00) (10 - 20)  SpO2: 98% (15 Deonte 2022 12:00) (97% - 99%)    PHYSICAL EXAM:  GENERAL: NAD, well-groomed, well-developed  HEAD:  Atraumatic, Normocephalic  EYES: EOMI, PERRLA, conjunctiva and sclera clear  ENMT: Moist mucous membranes, no lesions  NECK: Supple.  CHEST/LUNG: Clear to auscultation bilaterally; No rales, rhonchi, wheezing, or rubs  HEART: S1, S2.   ABDOMEN: Soft, Nontender, Nondistended; Bowel sounds present  EXTREMITIES:  2+ Peripheral Pulses, No clubbing, cyanosis, or edema  MS: No joint swelling or deformity.   LYMPH: No lymphadenopathy noted  SKIN: No rashes or lesions  NERVOUS SYSTEM:  Left leg weakness.   PSYCH:  Awake and alert.   LABS:                         15.3   6.19  )-----------( 260      ( 15 Deonte 2022 06:00 )             44.2     15 Deonte 2022 06:00    135    |  98     |  13     ----------------------------<  222    4.3     |  31     |  1.06     Ca    9.6        15 Deonte 2022 06:00      CAPILLARY BLOOD GLUCOSE  POCT Blood Glucose.: 208 mg/dL (15 Deonte 2022 11:57)  POCT Blood Glucose.: 253 mg/dL (15 Deonte 2022 07:35)  POCT Blood Glucose.: 220 mg/dL (14 Jun 2022 22:16)  POCT Blood Glucose.: 198 mg/dL (14 Jun 2022 16:46)    06-14 Chol 167  HDL 40<L> Trig 85    RADIOLOGY TEST: (IMAGES REVIEWED BY ME)  < from: CT Lumbar Spine No Cont (06.14.22 @ 14:44) >  ACC: [id number] EXAM:  CT LUMBAR SPINE                        ACC: [id number] EXAM:  CT BRAIN                          PROCEDURE DATE:  06/14/2022          INTERPRETATION:  Noncontrast CT of the brain. And lumbar spine    CLINICAL INDICATION:  Left lower extremity weakness    TECHNIQUE : Axial CT scanning of the brain and lumbar spine was obtained   from the skull base to the vertex without the administration of   intravenous contrast.    COMPARISON: Brain CT dated 6/13/2022    FINDINGS:    Brain CT:    No acute hemorrhage, hydrocephalus, midline shift or extra-axial   collections are identified.    The visualized paranasal sinuses and tympanomastoid cavities are free of   acute disease.    Lumbar spine CT:    The normal lumbar lordosis is maintained. No acute fracture or   dislocation is identified. Vacuum disc phenomenon is noted at the L1-2,   L4-5 and L5-S1 levels. Disc space narrowing and anterior osteophyte   formation present at L1-2    At the L4-5 and L5-S1 levels, there is evidencefor disc bulging and   bilateral facet ligamentous hypertrophic changes resulting in moderate to   severe central canal and moderate bilateral neural foraminal stenosis at   the L4-5 level.    No destructive lesions are identified.    IMPRESSION:    Brain CT: No acute hemorrhage, mass effect or extra-axial collections.    Lumbar spine CT: No acute fracture traumatic subluxation. Multilevel   degenerative changes with moderate to severe central canal stenosis at   L4-5. Lumbar spine MRI can be obtained for further evaluation.    --- End of Report ---      < end of copied text >  < from: CT Hip No Cont, Left (06.14.22 @ 14:45) >  ACC: [id number] EXAM:  CT HIP ONLY LT                          PROCEDURE DATE:  06/14/2022          INTERPRETATION:  CT HIP LEFT dated 6/14/2022 2:45 PM    INDICATION: Left leg weakness    COMPARISON: None available.    TECHNIQUE: CT imaging of the left hip was performed. The data was   reformatted in the axial, coronal, and sagittal planes. Additionally, 3-D   reformatted imaging was created.    FINDINGS:    OSSEOUS STRUCTURES: No fracture is identified. Relative preservation of   the hip joint space. Small os acetabuli along the left. Osseous bridging   across the left sacroiliac joint proximally. Mild spurring inferiorly.  SYNOVIUM/ JOINT FLUID: No joint effusion  TENDONS: Preserved tendons with mild enthesopathy at the proximal   hamstring.  MUSCLES: No intramuscular hematoma  NEUROVASCULAR STRUCTURES: Preserved  INTRAPELVIC SOFT TISSUES: Unremarkable  SUBCUTANEOUS SOFT TISSUES: Mild soft tissue swelling overlying the left   hip.    3-D reformatted imaging confirms these findings.    IMPRESSION:    No fracture or dislocation.    --- End of Report ---    < end of copied text >    Imaging Personally Reviewed:  [X] YES      HEALTH ISSUES - PROBLEM Dx:  Left leg weakness    Abdominal pain    Left hip pain    Type 2 diabetes mellitus    HTN (hypertension)    Hyperlipidemia    DM (diabetes mellitus)    Prophylactic measure    Uncontrolled type 2 diabetes mellitus with hyperglycemia

## 2022-06-15 NOTE — PROGRESS NOTE ADULT - PROBLEM SELECTOR PLAN 5
Continue patient on Losartan.  Monitor blood pressure per routine.
Continue patient on Losartan.  Monitor blood pressure per routine.

## 2022-06-15 NOTE — DISCHARGE NOTE PROVIDER - NSDCCPCAREPLAN_GEN_ALL_CORE_FT
PRINCIPAL DISCHARGE DIAGNOSIS  Diagnosis: Weakness of left leg  Assessment and Plan of Treatment: Increase activity as tolerated at home.   Out patient physical therapy.   Follow up with Dr. Becker (spine surgery) in 1-2 weeks.   Follow up with Dr. Perlman (endocrinology) in 1-2 weeks.   Follow up with Dr. Mancilla (PMD) in 1 week.      SECONDARY DISCHARGE DIAGNOSES  Diagnosis: Spinal stenosis  Assessment and Plan of Treatment:     Diagnosis: Uncontrolled type 2 diabetes mellitus with hyperglycemia  Assessment and Plan of Treatment:     Diagnosis: Hyperlipidemia  Assessment and Plan of Treatment:     Diagnosis: HTN (hypertension)  Assessment and Plan of Treatment:

## 2022-06-15 NOTE — DISCHARGE NOTE PROVIDER - NSDCMRMEDTOKEN_GEN_ALL_CORE_FT
aspirin 81 mg oral delayed release tablet: 1 tab(s) orally once a day  atorvastatin 80 mg oral tablet: 1 tab(s) orally once a day (at bedtime)  losartan 100 mg oral tablet: 1 tab(s) orally once a day  metFORMIN 1000 mg oral tablet, extended release: 1 tab(s) orally 2 times a day   SITagliptin 100 mg oral tablet: 1 tab(s) orally once a day

## 2022-06-15 NOTE — DISCHARGE NOTE NURSING/CASE MANAGEMENT/SOCIAL WORK - NSDCCRTYPESERV_GEN_ALL_CORE_FT
You were given a prescription for Outpatient Physical therapy and for a AFO orthotic by Dr. Ku.  You sad you have a physical therapist that you already use in the community.

## 2022-06-15 NOTE — PROGRESS NOTE ADULT - PROBLEM SELECTOR PLAN 3
X-ray of left hip negative.   CT hip negative.
X-ray of left hip negative.   Agree with CT left hip to r/o occult fracture.

## 2022-06-15 NOTE — PROGRESS NOTE ADULT - PROBLEM SELECTOR PLAN 1
Patient with left leg weakness and left foot drop, most likely due to severe spinal stenosis.   Ruled out for CVA.   Neurology input noted and appreciated.   Will need spine surgery follow up as out patient.   PT as out patient.
Patient with left leg weakness and left foot drop, most likely due to spinal stenosis.   Doubt CVA.   Patient can't have MRI due to shrapnel in left forearm.   Neurology input noted and appreciated.   Patient has seen spine surgery in past as out patient.   Will follow CT LS spine as per neurology.   Neurology follow up.

## 2022-06-15 NOTE — DISCHARGE NOTE PROVIDER - HOSPITAL COURSE
Patient admitted with left leg weakness and left foot drop.   Patient is ruled out for CVA.   His left leg weakness and foot drop is chronic and most likely due to severe spinal stenosis at L4-L5 level.   Patient will need spine surgery follow up  as out patient.   Patient also has uncontrolled diabetes mellitus and needs follow up with PMD/Endocrinology for optimal diabetes control.   Patient is being discharged home with out patient follow up with PMD, Endocrinology, Spine surgery and PT.

## 2022-06-15 NOTE — DISCHARGE NOTE NURSING/CASE MANAGEMENT/SOCIAL WORK - PATIENT PORTAL LINK FT
You can access the FollowMyHealth Patient Portal offered by [facility] by registering at the following website: [email or web address]. By joining [facility]’s FollowMyHealth portal, you will also be able to view your health information using other applications (apps) compatible with our system.

## 2022-06-15 NOTE — PROGRESS NOTE ADULT - NSPROGADDITIONALINFOA_GEN_ALL_CORE
Discussed with patient and family.     Discharge planning.
Discussed with patient in detail.     Will discharge home.     I spent more than 35 minutes on discharging the patient.     Message sent to patient's PMD.

## 2022-06-15 NOTE — DISCHARGE NOTE PROVIDER - PROVIDER TOKENS
PROVIDER:[[id number]],FOLLOWUP:[2 weeks],ESTABLISHEDPATIENT:[T]],PROVIDER:[TOKEN:[[id number]],FOLLOWUP:[2 weeks]],PROVIDER:[TOKEN:[[id number]],FOLLOWUP:[1 week],ESTABLISHEDPATIENT:[T]] PROVIDER:[TOKEN:[[id number]],FOLLOWUP:[2 weeks],ESTABLISHEDPATIENT:[T]],PROVIDER:[TOKEN:[[id number]],FOLLOWUP:[2 weeks]],PROVIDER:[TOKEN:[[id number]],SCHEDULEDAPPT:[07/15/2022],SCHEDULEDAPPTTIME:[11:30 AM],ESTABLISHEDPATIENT:[T]]

## 2022-06-15 NOTE — PROGRESS NOTE ADULT - ASSESSMENT
56 years old male with past medical history of hypertension, diabetes mellitus, hyperlipidemia, who has been having some left leg weakness and gait abnormality for last many months.  Patient developed some lower abdominal pain few days ago and was seen at another facility.  Patient apparently had x-ray done and was advised that he had constipation.  Patient went back to work last night.  He had increasing weakness in his left leg and more pronounced footdrop.  He came into the emergency for the same.  Patient was evaluated and is being admitted to hospital as per ER physician for rule out CVA.  
The patient is a 56 year old male with a history of HTN, HL, DM who presented with left leg weakness and difficulty walking.    Plan:  - ECG with no evidence of ischemia or infarction  - Echo with normal LV systolic function, no significant valve issues  - Telemetry with no events  - Increase losartan to 100 mg daily  - Continue aspirin 81 mg daily  - Continue atorvastatin 80  mg daily  - Neurology follow-up
56 years old male with past medical history of hypertension, diabetes mellitus, hyperlipidemia, who has been having some left leg weakness and gait abnormality for last many months.  Patient developed some lower abdominal pain few days ago and was seen at another facility.  Patient apparently had x-ray done and was advised that he had constipation.  Patient went back to work last night.  He had increasing weakness in his left leg and more pronounced footdrop.  He came into the emergency for the same.  Patient was evaluated and is being admitted to hospital as per ER physician for rule out CVA.

## 2022-06-15 NOTE — PROGRESS NOTE ADULT - PROBLEM SELECTOR PLAN 2
Ruled out for gastric out let obstruction.   GI follow up as out patient as per PMD.
X-ray abdomen noted from last night.   CT abdomen done.   Gastric out let obstruction ruled out.   Will need GI work as out patient.

## 2022-06-15 NOTE — DISCHARGE NOTE PROVIDER - CARE PROVIDER_API CALL
Deepak Becker (MD)  Orthopaedic Surgery  [address], [address]  [place] [address]  Phone: [phone]  Fax: [phone]  Established Patient  Follow Up Time: 2 weeks    Perlman, Craig D (DO)  Internal Medicine  [address], [address]  [place] [address]  Phone: [phone]  Fax: [phone]  Follow Up Time: 2 weeks    Clifton Mancilla [id number] [place] [address]  Phone: [phone]  Fax: [phone]  Established Patient  Follow Up Time: 1 week   Deepak Becker (MD)  Orthopaedic Surgery  [address], [address]  [place] [address]  Phone: [phone]  Fax: [phone]  Established Patient  Follow Up Time: 2 weeks    Perlman, Craig D (DO)  Internal Medicine  [address]  [place] [address]  Phone: [phone]  Fax: [phone]  Follow Up Time: 2 weeks    [place] [address]  Phone: [phone]  Fax: [phone]  Established Patient  Scheduled Appointment: 07/15/2022 11:30 AM

## 2022-06-21 PROBLEM — E78.5 HYPERLIPIDEMIA, UNSPECIFIED: Chronic | Status: ACTIVE | Noted: 2022-06-13

## 2022-06-21 PROBLEM — I10 ESSENTIAL (PRIMARY) HYPERTENSION: Chronic | Status: ACTIVE | Noted: 2022-06-13

## 2022-06-21 PROBLEM — E11.9 TYPE 2 DIABETES MELLITUS WITHOUT COMPLICATIONS: Chronic | Status: ACTIVE | Noted: 2022-06-13

## 2022-06-27 ENCOUNTER — NON-APPOINTMENT (OUTPATIENT)
Age: 57
End: 2022-06-27

## 2022-06-29 ENCOUNTER — NON-APPOINTMENT (OUTPATIENT)
Age: 57
End: 2022-06-29

## 2022-06-29 ENCOUNTER — APPOINTMENT (OUTPATIENT)
Dept: ORTHOPEDIC SURGERY | Facility: CLINIC | Age: 57
End: 2022-06-29

## 2022-06-29 DIAGNOSIS — M51.37 OTHER INTERVERTEBRAL DISC DEGENERATION, LUMBOSACRAL REGION: ICD-10-CM

## 2022-06-29 PROCEDURE — 99204 OFFICE O/P NEW MOD 45 MIN: CPT

## 2022-06-29 RX ORDER — SITAGLIPTIN 100 MG/1
100 TABLET, FILM COATED ORAL
Qty: 90 | Refills: 0 | Status: ACTIVE | COMMUNITY
Start: 2021-10-27

## 2022-06-29 RX ORDER — LOSARTAN POTASSIUM 100 MG/1
100 TABLET, FILM COATED ORAL
Qty: 30 | Refills: 0 | Status: ACTIVE | COMMUNITY
Start: 2022-06-15

## 2022-06-29 NOTE — DISCUSSION/SUMMARY
[Medication Risks Reviewed] : Medication risks reviewed [de-identified] : Patient presents with progressive weakness of his left leg.  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  He has had previous CT and MRI lumbar spine which is revealed some stenosis and disc protrusions.  However his clinical presentation does not correlate with those findings.  He even has had a CT of the head which does not show any acute findings.  At this point recommended MRI of the thoracic and lumbar spine to better assess his symptoms.  A referral to a neurologist was provided again since a previous EMG approximately 3 years ago did not reveal any acute radiculopathy.  Based on his clinical evaluation further treatment options can be considered.  He understands that there may be no surgical interventions that could potentially help this patient.

## 2022-06-29 NOTE — HISTORY OF PRESENT ILLNESS
[0] : a current pain level of 0/10 [Walking] : walking [Rest] : relieved by rest [de-identified] : Patient is here today for evaluation on his left hip and low back pain. Patient went to  at [place] [facility] 6/13/22 admitted for 3 days cat scans of the brain hip and low back was performed told to see orthopedist. Patient is complaining of weakness difficulty ambulating

## 2022-06-29 NOTE — PHYSICAL EXAM
[Apractic] : apractic [Full] : is full [] : Motor: [NL] : Motor strength of the right lower extremity was normal [___/5] : left ([unfilled]/5) [LE] : Sensory: Intact in bilateral lower extremities [2+] : left patella 2+ [1+] : right ankle jerk 1+ [0] : left ankle jerk 0 [DP] : dorsalis pedis 2+ and symmetric bilaterally [PT] : posterior tibial 2+ and symmetric bilaterally [de-identified] : ACC: [id number] EXAM: CT HIP ONLY LT\par \par PROCEDURE DATE: 06/14/2022\par \par \par \par INTERPRETATION: CT HIP LEFT dated 6/14/2022 2:45 PM\par \par INDICATION: Left leg weakness\par \par COMPARISON: None available.\par \par TECHNIQUE: CT imaging of the left hip was performed. The data was reformatted in the axial, coronal, and sagittal planes. Additionally, 3-D reformatted imaging was created.\par \par FINDINGS:\par \par OSSEOUS STRUCTURES: No fracture is identified. Relative preservation of the hip joint space. Small os acetabuli along the left. Osseous bridging across the left sacroiliac joint proximally. Mild spurring inferiorly.\par SYNOVIUM/ JOINT FLUID: No joint effusion\par TENDONS: Preserved tendons with mild enthesopathy at the proximal hamstring.\par MUSCLES: No intramuscular hematoma\par NEUROVASCULAR STRUCTURES: Preserved\par INTRAPELVIC SOFT TISSUES: Unremarkable\par SUBCUTANEOUS SOFT TISSUES: Mild soft tissue swelling overlying the left hip.\par \par 3-D reformatted imaging confirms these findings.\par \par IMPRESSION:\par \par No fracture or dislocation.\par \par --- End of Report ---\par \par ED BRONSON MD; Attending Radiologist\par This document has been electronically signed. Jun 14 2022 3:01PM\par \par ________\par \par \par ACC: [id number] EXAM: CT LUMBAR SPINE\par ACC: [id number] EXAM: CT BRAIN\par \par PROCEDURE DATE: 06/14/2022\par \par INTERPRETATION: Noncontrast CT of the brain. And lumbar spine\par \par CLINICAL INDICATION: Left lower extremity weakness\par \par TECHNIQUE : Axial CT scanning of the brain and lumbar spine was obtained from the skull base to the vertex without the administration of intravenous contrast.\par \par COMPARISON: Brain CT dated 6/13/2022\par \par FINDINGS:\par \par Brain CT:\par \par No acute hemorrhage, hydrocephalus, midline shift or extra-axial collections are identified.\par \par The visualized paranasal sinuses and tympanomastoid cavities are free of acute disease.\par \par Lumbar spine CT:\par \par The normal lumbar lordosis is maintained. No acute fracture or dislocation is identified. Vacuum disc phenomenon is noted at the L1-2, L4-5 and L5-S1 levels. Disc space narrowing and anterior osteophyte formation present at L1-2\par \par At the L4-5 and L5-S1 levels, there is evidence for disc bulging and bilateral facet ligamentous hypertrophic changes resulting in moderate to severe central canal and moderate bilateral neural foraminal stenosis at the L4-5 level.\par \par No destructive lesions are identified.\par \par IMPRESSION:\par \par Brain CT: No acute hemorrhage, mass effect or extra-axial collections.\par \par Lumbar spine CT: No acute fracture traumatic subluxation. Multilevel degenerative changes with moderate to severe central canal stenosis at L4-5. Lumbar spine MRI can be obtained for further evaluation.\par \par --- End of Report ---\par \par QUINTON AVELAR MD; Attending Radiologist\par This document has been electronically signed. Jun 14 2022 2:57PM [Poor Appearance] : well-appearing [Acute Distress] : not in acute distress [Obese] : not obese [Abl Mood] : in a normal mood [Abl Affect] : with normal affect [Poor Coordination] : normal coordination [Disorientation] : oriented x 3 [Painful] : not painful [SLR] : negative straight leg raise [Plantar Reflex Right Only] : absent on the right [Plantar Reflex Left Only] : absent on the left [DTR Reflexes Clonus Of Right Ankle (___ Beats)] : absent on the right [DTR Reflexes Clonus Of Left Ankle (___ Beats)] : absent on the left [FreeTextEntry2] : The pt is awake, alert and oriented to self, place and time, is comfortable and in no acute distress. Inspection of neck, back and lower extremities bilaterally reveals no rashes or ecchymotic lesions.  There is no obvious abnormal spinal curvature in the sagittal and coronal planes. There is no tenderness over the cervical, thoracic or lumbar spine, or the paraspinal or upper and lower extremities musculature. There is no sacroiliac tenderness. No greater trochanteric tenderness bilaterally. No atrophy or abnormal movements noted in the upper or lower extremities. There is no swelling noted in the upper or lower extremities bilaterally. No cervical lymphadenopathy noted anteriorly. No joint laxity noted in the upper and lower extremity joints bilaterally.\par Lumbar spine range of motion is pain free with forward flexion to [default value], extension [default value] degrees with no discomfort. Hip range of motion is degrees internal rotation 30° external rotation without pain. Full range of motion of the shoulders bilaterally with no significant pain\par Negative straight leg raise to 45° in the sitting position bilaterally. There is no groin pain with hip internal rotation and a negative VERONA test bilaterally.  [de-identified] : Seen today with his nephew who translated

## 2022-06-29 NOTE — REASON FOR VISIT
[Initial Visit] : an initial visit for [Back Pain] : back pain [Family Member] : family member [FreeTextEntry2] : Left hip pain

## 2022-07-17 ENCOUNTER — OUTPATIENT (OUTPATIENT)
Dept: OUTPATIENT SERVICES | Facility: HOSPITAL | Age: 57
LOS: 1 days | End: 2022-07-17

## 2022-07-17 ENCOUNTER — APPOINTMENT (OUTPATIENT)
Dept: MRI IMAGING | Facility: CLINIC | Age: 57
End: 2022-07-17

## 2022-07-17 DIAGNOSIS — M54.16 RADICULOPATHY, LUMBAR REGION: ICD-10-CM

## 2022-07-17 DIAGNOSIS — Z00.8 ENCOUNTER FOR OTHER GENERAL EXAMINATION: ICD-10-CM

## 2022-07-31 ENCOUNTER — APPOINTMENT (OUTPATIENT)
Dept: MRI IMAGING | Facility: CLINIC | Age: 57
End: 2022-07-31

## 2022-07-31 ENCOUNTER — OUTPATIENT (OUTPATIENT)
Dept: OUTPATIENT SERVICES | Facility: HOSPITAL | Age: 57
LOS: 1 days | End: 2022-07-31
Payer: COMMERCIAL

## 2022-07-31 DIAGNOSIS — M51.37 OTHER INTERVERTEBRAL DISC DEGENERATION, LUMBOSACRAL REGION: ICD-10-CM

## 2022-07-31 PROCEDURE — 72148 MRI LUMBAR SPINE W/O DYE: CPT | Mod: 26

## 2022-07-31 PROCEDURE — 72146 MRI CHEST SPINE W/O DYE: CPT

## 2022-07-31 PROCEDURE — 72146 MRI CHEST SPINE W/O DYE: CPT | Mod: 26

## 2022-07-31 PROCEDURE — 72148 MRI LUMBAR SPINE W/O DYE: CPT

## 2022-08-15 ENCOUNTER — NON-APPOINTMENT (OUTPATIENT)
Age: 57
End: 2022-08-15

## 2022-08-15 ENCOUNTER — APPOINTMENT (OUTPATIENT)
Dept: ORTHOPEDIC SURGERY | Facility: CLINIC | Age: 57
End: 2022-08-15

## 2022-08-15 VITALS
SYSTOLIC BLOOD PRESSURE: 167 MMHG | HEIGHT: 66 IN | HEART RATE: 84 BPM | DIASTOLIC BLOOD PRESSURE: 103 MMHG | BODY MASS INDEX: 24.11 KG/M2 | WEIGHT: 150 LBS

## 2022-08-15 DIAGNOSIS — R26.9 UNSPECIFIED ABNORMALITIES OF GAIT AND MOBILITY: ICD-10-CM

## 2022-08-15 PROCEDURE — 99213 OFFICE O/P EST LOW 20 MIN: CPT

## 2022-08-15 RX ORDER — GLIMEPIRIDE 1 MG/1
1 TABLET ORAL
Qty: 90 | Refills: 0 | Status: ACTIVE | COMMUNITY
Start: 2022-07-15

## 2022-08-15 RX ORDER — AMLODIPINE BESYLATE 5 MG/1
5 TABLET ORAL
Qty: 30 | Refills: 0 | Status: ACTIVE | COMMUNITY
Start: 2022-07-15

## 2022-08-15 NOTE — HISTORY OF PRESENT ILLNESS
[0] : a current pain level of 0/10 [Walking] : walking [Daily] : ~He/She~ states the symptoms seem to be occuring daily [Rest] : relieved by rest [de-identified] : Patient is here today to review mri's thoracic and lumbar spine 7/31/22. No changes in symptoms.

## 2022-08-15 NOTE — DISCUSSION/SUMMARY
[Medication Risks Reviewed] : Medication risks reviewed [de-identified] : MRI of the thoracic and lumbar spine was independently reviewed by me with the patient and his nephew in person.  Patient has right-sided foraminal stenosis L3-4 L4-5 more than the left side though he has no symptoms on the right.  He has proximal leg weakness.  I cannot explain his symptoms based on the MRI findings with thoracic and lumbar spine to also include the scalp views of the cervical spine.  Recommended evaluation by neurologist referral was again provided today.  No clear indication for surgical intervention in his spine given the current presentation.

## 2022-08-15 NOTE — PHYSICAL EXAM
[Apractic] : apractic [Full] : is full [] : Motor: [NL] : Motor strength of the right lower extremity was normal [___/5] : left ([unfilled]/5) [LE] : Sensory: Intact in bilateral lower extremities [2+] : left patella 2+ [1+] : right ankle jerk 1+ [0] : left ankle jerk 0 [DP] : dorsalis pedis 2+ and symmetric bilaterally [PT] : posterior tibial 2+ and symmetric bilaterally [de-identified] : EXAM: [id number] - MR SPINE LUMBAR - ORDERED BY: SHIRA CHUN\par \par EXAM: [id number] - MR SPINE THORACIC - ORDERED BY: SHIRA CHUN\par \par PROCEDURE DATE: 07/31/2022\par \par INTERPRETATION: THORACIC AND LUMBOSACRAL SPINE MRI\par \par CLINICAL INFORMATION: Altered gait and left leg weakness.\par TECHNIQUE: Multiplanar and multisequence MR imaging was obtained of the thoracic and lumbosacral spine without the use of intravenous or intra-articular contrast.\par COMPARISON: Abdomen/pelvis CT 6/14/2022. Lumbar spine MRI 6/16/2018.\par \par FINDINGS:\par \par THORACIC SPINE:\par \par SPINAL ALIGNMENT: Mild leftward curvature of the thoracic spine centered at the level of T9.\par BONE MARROW: Vertebral body heights are maintained.\par FACET JOINTS: Facet joints are well aligned at all levels.\par CORD: Normal in caliber and signal characteristics.\par DISC SPACES: Mild loss of intervertebral disc height at the T6-T9 levels.\par PARASPINAL MUSCLES/SOFT TISSUES: Within normal limits.\par THORACIC SOFT TISSUES: Within normal limits.\par \par DISC LEVEL EVALUATION:\par \par T1/T2: No central or neural foraminal stenosis.\par T2/T3: No central or neural foraminal stenosis.\par T3/T4: No central or neural foraminal stenosis.\par T4/T5: No central or neural foraminal stenosis.\par T5/T6: No central or neural foraminal stenosis.\par T6/T7: No central or neural foraminal stenosis.\par T7/T8: No central or neural foraminal stenosis.\par T8/T9: Mild facet arthropathy. Mild left neural foraminal stenosis. No central or right neural foraminal stenosis.\par T9/T10: Disc bulge. No central or neural foraminal stenosis.\par T10/T11: Disc bulge and bilateral facet arthropathy. No central or neural foraminal stenosis.\par T11/T12: No central or neural foraminal stenosis.\par T12/L1: No central or neural foraminal stenosis.\par \par LUMBOSACRAL SPINE:\par \par SPINAL SEGMENTATION: The patient has 5 non-rib bearing lumbar type vertebral bodies.\par SPINAL ALIGNMENT: Mild straightening of the normal lumbar lordosis.\par MARROW: Vertebral body heights are preserved. Schmorl's node formation is seen in the inferior endplate of L1. Mild degenerative marrow edema at the L4-S1 levels.\par DISTAL CORD AND CONUS: Conus terminates at the level of L2.\par DISC SPACES: Loss of intervertebral disc height at the L4-S1 levels.\par PARASPINAL MUSCLE AND SOFT TISSUES: Mild fatty infiltration of the posterior paraspinous musculature.\par INTRAABDOMINAL/INTRAPELVIC SOFT TISSUES: Left renal cyst formation.\par \par DISC LEVEL EVALUATION:\par \par T12/L1: No central or neural foraminal stenosis.\par L1/L2: Disc bulge with posterior osseous ridging and facet arthropathy causing mild right and moderate left neural foraminal stenosis.\par L2/L3: No central or neural foraminal stenosis.\par L3/L4: Disc bulge asymmetric to the right causing severe right and mild left neural foraminal stenosis.\par L4/L5: Disc bulge and bilateral facet arthropathy resulting in moderate bilateral neural foraminal stenosis.\par L5/S1: Disc bulge and bilateral facet arthropathy causing severe right and mild left neural foraminal stenosis.\par \par IMPRESSION:\par Multilevel spondylosis of the thoracolumbar spine, as described above.\par \par --- End of Report ---\par \par BRYAN ALFREDO MD; Attending Radiologist\par This document has been electronically signed. Aug 5 2022 5:22PM [Poor Appearance] : well-appearing [Acute Distress] : not in acute distress [Obese] : not obese [Abl Mood] : in a normal mood [Abl Affect] : with normal affect [Poor Coordination] : normal coordination [Disorientation] : oriented x 3 [Painful] : not painful [SLR] : negative straight leg raise [Plantar Reflex Right Only] : absent on the right [Plantar Reflex Left Only] : absent on the left [DTR Reflexes Clonus Of Right Ankle (___ Beats)] : absent on the right [DTR Reflexes Clonus Of Left Ankle (___ Beats)] : absent on the left [FreeTextEntry2] : The pt is awake, alert and oriented to self, place and time, is comfortable and in no acute distress. Inspection of neck, back and lower extremities bilaterally reveals no rashes or ecchymotic lesions.  There is no obvious abnormal spinal curvature in the sagittal and coronal planes. There is no tenderness over the cervical, thoracic or lumbar spine, or the paraspinal or upper and lower extremities musculature. There is no sacroiliac tenderness. No greater trochanteric tenderness bilaterally. No atrophy or abnormal movements noted in the upper or lower extremities. There is no swelling noted in the upper or lower extremities bilaterally. No cervical lymphadenopathy noted anteriorly. No joint laxity noted in the upper and lower extremity joints bilaterally.\par Lumbar spine range of motion is pain free with forward flexion to [default value], extension [default value] degrees with no discomfort. Hip range of motion is degrees internal rotation 30° external rotation without pain. Full range of motion of the shoulders bilaterally with no significant pain\par Negative straight leg raise to 45° in the sitting position bilaterally. There is no groin pain with hip internal rotation and a negative VERONA test bilaterally.  [de-identified] : Seen today with his nephew who translated, also spoke with his daughter over the telephone

## 2022-08-15 NOTE — REASON FOR VISIT
[Follow-Up Visit] : a follow-up visit for [Degenerative Joint Disease] : degenerative joint disease [Family Member] : family member [FreeTextEntry2] : Left foot drop altered gait

## 2022-10-26 ENCOUNTER — APPOINTMENT (OUTPATIENT)
Dept: NEUROLOGY | Facility: CLINIC | Age: 57
End: 2022-10-26

## 2022-10-26 VITALS
BODY MASS INDEX: 24.11 KG/M2 | HEIGHT: 66 IN | HEART RATE: 97 BPM | WEIGHT: 150 LBS | SYSTOLIC BLOOD PRESSURE: 167 MMHG | DIASTOLIC BLOOD PRESSURE: 98 MMHG

## 2022-10-26 DIAGNOSIS — Z86.39 PERSONAL HISTORY OF OTHER ENDOCRINE, NUTRITIONAL AND METABOLIC DISEASE: ICD-10-CM

## 2022-10-26 PROCEDURE — 99204 OFFICE O/P NEW MOD 45 MIN: CPT

## 2022-10-27 PROBLEM — Z86.39 HISTORY OF DIABETES MELLITUS: Status: RESOLVED | Noted: 2018-06-15 | Resolved: 2022-10-27

## 2022-10-27 RX ORDER — GABAPENTIN 300 MG/1
300 CAPSULE ORAL
Qty: 60 | Refills: 0 | Status: ACTIVE | COMMUNITY
Start: 2022-08-19

## 2022-10-27 NOTE — REVIEW OF SYSTEMS
[Leg Weakness] : leg weakness [Arthralgias] : arthralgias [As Noted in HPI] : as noted in HPI [Negative] : Heme/Lymph

## 2022-10-27 NOTE — PHYSICAL EXAM
[FreeTextEntry1] : General examination is unremarkable.  Head neck, ear nose and throat is unremarkable.  There is no carotid bruit, thyromegaly or lymphadenopathy.  Chest is clear and heart sounds are normal.  Abdomen is soft there is no tenderness or organomegaly.  Bladder is not distended.  Pedal pulsations are preserved and there are no clear trophic changes in the feet and no leg edema.  There is no Tinel sign over the common peroneal nerve in the lateral knee region and popliteal fossa is not tender and there is no swelling.  Straight leg raising test is negative and lumbar spine range of motion is normal.  There are no meningeal signs.\par \par Neurological examination clearly indicated a left foot drop with 3-4/5 strength with normal plantar flexion and there is no sensory abnormality in both lower extremity including normal position sense and no dermatomal pattern and no sensory level whereas vibration perception is decreased in the left foot and ankle reflexes are absent bilaterally.\par \par Rest of his neurological examination is unremarkable as delineated.  The patient has left foot drop of 3/5 strength [General Appearance - Alert] : alert [General Appearance - In No Acute Distress] : in no acute distress [Oriented To Time, Place, And Person] : oriented to person, place, and time [Impaired Insight] : insight and judgment were intact [Affect] : the affect was normal [Person] : oriented to person [Place] : oriented to place [Concentration Intact] : normal concentrating ability [Time] : oriented to time [Visual Intact] : visual attention was ~T not ~L decreased [Naming Objects] : no difficulty naming common objects [Repeating Phrases] : no difficulty repeating a phrase [Writing A Sentence] : no difficulty writing a sentence [Fluency] : fluency intact [Comprehension] : comprehension intact [Reading] : reading intact [Past History] : adequate knowledge of personal past history [Cranial Nerves Optic (II)] : visual acuity intact bilaterally,  visual fields full to confrontation, pupils equal round and reactive to light [Cranial Nerves Oculomotor (III)] : extraocular motion intact [Cranial Nerves Trigeminal (V)] : facial sensation intact symmetrically [Cranial Nerves Facial (VII)] : face symmetrical [Cranial Nerves Vestibulocochlear (VIII)] : hearing was intact bilaterally [Cranial Nerves Glossopharyngeal (IX)] : tongue and palate midline [Cranial Nerves Accessory (XI - Cranial And Spinal)] : head turning and shoulder shrug symmetric [Cranial Nerves Hypoglossal (XII)] : there was no tongue deviation with protrusion [Motor Strength] : muscle strength was normal in all four extremities [Motor Tone] : muscle tone was normal in all four extremities [No Muscle Atrophy] : normal bulk in all four extremities [Sensation Tactile Decrease] : light touch was intact [Abnormal Walk] : normal gait [Balance] : balance was intact [Past-pointing] : there was no past-pointing [Tremor] : no tremor present [2+] : Patella left 2+ [0] : Ankle jerk left 0 [Plantar Reflex Right Only] : normal on the right [Plantar Reflex Left Only] : normal on the left [FreeTextEntry7] : There is decreased vibration perception in the feet but rest of his examination is completely normal.  Opinion– [FreeTextEntry6] : The patient has left foot drop with 3-4/5 strength without any plantar flexion dysfunction and rest of the muscles are normal.

## 2022-10-27 NOTE — DATA REVIEWED
[de-identified] : I reviewed extensive MRI imaging studies undertaken by his orthopedic surgeon and noted the contents of degenerative disease of his spine but there is no discrete compressive disease. [de-identified] : I also reviewed the electrodiagnostic studies undertaken at [facility] without any clear evidence of common peroneal palsy and that study was done at least twice with mostly normal study. [de-identified] : I also reviewed consultation report of his orthopedic surgeon which is extensive and he is not certain whether he is a surgical candidate for lumbar radiculopathy and referred him for thorough neurological evaluation.

## 2022-10-27 NOTE — HISTORY OF PRESENT ILLNESS
[FreeTextEntry1] : Mr. Conley is a 57-year-old male who accompanied his son Kristian for neurological consultation and his records indicate that he had electrodiagnostic studies in the past approximately 4 to 5 years ago and was referred today by Dr. Becker.\par \par The patient is stated that he has left foot drop since last 6 years and he had extensive work-up which were unremarkable and ever since he has minor improvement and the foot drop was not sudden and insidious in onset and had extensive investigations including several MRI reports which were reviewed and history of diabetes treated with metformin and has an internist to Dr. Sams.  He does not speak enough English and speaks Frisian and his son translated all his symptoms and details.  A review of systems fails to reveal any headache diplopia or dysarthria or dysphagia or dyspnea there is no history of chest pain shortness of breath neck pain or radicular symptoms in the upper extremities and denied any significant low back pain or even a clear history of neurogenic claudication and there is no resting paresthesias in his feet or legs and stated that his left foot drop has been insidious in onset for last 6 years without any discernible evidence of common peroneal palsy.  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  I reviewed his extensive MRI imaging studies

## 2022-10-27 NOTE — DISCUSSION/SUMMARY
[FreeTextEntry1] : Opinion–the patient clearly has chronic history of moderate left foot drop and the previous electrodiagnostic studies did not reveal any clear evidence of common peroneal palsy or L5 radiculopathy and today I advised him that in view of left foot drop he will need a thorough electrophysiologic evaluation including evidence of possible diabetic peripheral neuropathy or mononeuritis and an appointment has been advised as soon as possible and this was explained to him by his son in clear Cymro language and he expressed understanding and will comply.  Extensive education counseling and fall precautions were discussed and he expressed understanding in addition to securing reports of his diabetic status from his internist.

## 2023-02-02 ENCOUNTER — APPOINTMENT (OUTPATIENT)
Dept: NEUROLOGY | Facility: CLINIC | Age: 58
End: 2023-02-02
Payer: COMMERCIAL

## 2023-02-02 PROCEDURE — 95886 MUSC TEST DONE W/N TEST COMP: CPT

## 2023-02-02 PROCEDURE — 95909 NRV CNDJ TST 5-6 STUDIES: CPT

## 2023-02-25 ENCOUNTER — APPOINTMENT (OUTPATIENT)
Dept: MRI IMAGING | Facility: CLINIC | Age: 58
End: 2023-02-25
Payer: COMMERCIAL

## 2023-02-25 ENCOUNTER — OUTPATIENT (OUTPATIENT)
Dept: OUTPATIENT SERVICES | Facility: HOSPITAL | Age: 58
LOS: 1 days | End: 2023-02-25
Payer: COMMERCIAL

## 2023-02-25 DIAGNOSIS — M21.372 FOOT DROP, LEFT FOOT: ICD-10-CM

## 2023-02-25 PROCEDURE — 70551 MRI BRAIN STEM W/O DYE: CPT | Mod: 26

## 2023-02-25 PROCEDURE — 70551 MRI BRAIN STEM W/O DYE: CPT

## 2023-05-15 ENCOUNTER — APPOINTMENT (OUTPATIENT)
Dept: NEUROLOGY | Facility: CLINIC | Age: 58
End: 2023-05-15
Payer: COMMERCIAL

## 2023-05-15 VITALS
HEIGHT: 66 IN | BODY MASS INDEX: 24.43 KG/M2 | SYSTOLIC BLOOD PRESSURE: 143 MMHG | WEIGHT: 152 LBS | DIASTOLIC BLOOD PRESSURE: 88 MMHG | HEART RATE: 91 BPM

## 2023-05-15 DIAGNOSIS — M54.16 RADICULOPATHY, LUMBAR REGION: ICD-10-CM

## 2023-05-15 DIAGNOSIS — M21.372 FOOT DROP, LEFT FOOT: ICD-10-CM

## 2023-05-15 DIAGNOSIS — Z83.3 FAMILY HISTORY OF DIABETES MELLITUS: ICD-10-CM

## 2023-05-15 PROCEDURE — 99214 OFFICE O/P EST MOD 30 MIN: CPT

## 2023-05-16 PROBLEM — M54.16 LEFT LUMBAR RADICULITIS: Status: ACTIVE | Noted: 2018-06-15

## 2023-05-16 PROBLEM — M21.372 LEFT FOOT DROP: Status: ACTIVE | Noted: 2019-02-22

## 2023-05-16 PROBLEM — Z83.3 FAMILY HISTORY OF DIABETES MELLITUS: Status: ACTIVE | Noted: 2023-05-16

## 2023-05-16 NOTE — REVIEW OF SYSTEMS
[Feeling Poorly] : feeling poorly [Leg Weakness] : leg weakness [Difficulty Writing] : difficulty writing [Numbness] : numbness [Tingling] : tingling [Limping] : limping [As Noted in HPI] : as noted in HPI [Negative] : Heme/Lymph

## 2023-05-16 NOTE — DISCUSSION/SUMMARY
[FreeTextEntry1] : Opinion–the patient has multiple medical issues with a background history of hypertension diabetes diabetic peripheral neuropathy small stroke which was asymptomatic and is currently on a baby aspirin and was advised that he is a major stroke risk factor individual and should see a stroke expert for appropriate investigation and treatment including good control of diabetes and hypertension.\par \par He continues to have significant leg weakness and was advised to return back to his orthopedic surgeon for conclusive treatment strategy particularly with reference to any surgical decompression of the lower lumbar spine and he expressed understanding and was discussed with his sister who was present today.\par \par He was advised to return back for electrodiagnostic evaluation after the aforementioned and his sister also stated that she is diabetic with intermittent tingling and numbness and was advised to make a consultation appointment.  Extensive education and counseling was provided with proper follow-up evaluation and compliance and to remain under the care of his internist Dr. Sams for multiple medical issues and he expressed understanding..

## 2023-05-16 NOTE — HISTORY OF PRESENT ILLNESS
[FreeTextEntry1] : Mr. Conley is a 57-year-old gentleman who accompanied his sister Eufemia Arevalo for neurological follow-up since his last evaluation in October 2022 and has been noncompliant and today came for regular follow-up evaluations.\par \par The patient was originally referred by Dr. Becker and his initial evaluation had revealed history of normal EMG study in 2018 and in 2022 did not exhibit any clear neurological findings and MRI of the brain had revealed a small stroke and was advised thorough medical evaluation because of history of diabetes and hypertension and he stated that he has been under the care of his internist and has been on a baby aspirin ever since and advised him to forward the reports of Dr. Mancilla his family physician including returning back to  Of orthopedic surgery as he has significant disease in the lumbar spine and also referred him to a stroke expert as he has significant risk factors for stroke or MI because of diabetes hypertension and possibly cholesterol abnormalities including family history of stroke and diabetes and this was discussed in details in the presence of his sister who expressed understanding and will proceed with my advice.  He has been employed has no toxic habits lives with his family.  Review of system is otherwise unremarkable and he denies any other interim past medical history.

## 2023-05-17 ENCOUNTER — APPOINTMENT (OUTPATIENT)
Dept: NEUROLOGY | Facility: CLINIC | Age: 58
End: 2023-05-17
Payer: COMMERCIAL

## 2023-05-17 ENCOUNTER — NON-APPOINTMENT (OUTPATIENT)
Age: 58
End: 2023-05-17

## 2023-05-17 VITALS
WEIGHT: 152 LBS | BODY MASS INDEX: 24.43 KG/M2 | SYSTOLIC BLOOD PRESSURE: 177 MMHG | DIASTOLIC BLOOD PRESSURE: 98 MMHG | HEART RATE: 97 BPM | HEIGHT: 66 IN

## 2023-05-17 DIAGNOSIS — I82.409 ACUTE EMBOLISM AND THROMBOSIS OF UNSPECIFIED DEEP VEINS OF UNSPECIFIED LOWER EXTREMITY: ICD-10-CM

## 2023-05-17 PROCEDURE — 99215 OFFICE O/P EST HI 40 MIN: CPT

## 2023-05-17 RX ORDER — ASPIRIN 81 MG/1
81 TABLET, COATED ORAL
Qty: 30 | Refills: 0 | Status: ACTIVE | COMMUNITY
Start: 2023-04-19

## 2023-05-17 NOTE — PHYSICAL EXAM
[FreeTextEntry1] : Generally looked well. Mental status exam: Alert, oriented x3, speech fluent/prosodic without paraphasias; comprehension intact; memory and fund of knowledge intact. On cranial nerve exam, I was unable to see the fundi; the remainder of cranial nerves II through XII was intact. On motor exam tone was normal;  there was no drift. Fine finger movements are intact.  Motor exam: Left leg- moderate  atrophy, particularly the distal anterior and posterior compartments; iliopsoas-trace effort versus gravity; anterior tibialis 0/5; otherwise   power in the other extremities was normal throughout. Reflexes were 1+ in the arms, absent in the legs except for the left patellar reflex which was 2+.  Plantar reflexes were downgoing on the right and there was possibly a subtle Babinski sign on the left. Coordination in the arms was intact.  His gait showed a probable steppage pattern on the left; Tandem gait was not tested; Romberg test was negative.  On sensory exam, there might have been subtly decreased vibratory and joint position sense at the left toes.   [General Appearance - Alert] : alert [General Appearance - In No Acute Distress] : in no acute distress [Oriented To Time, Place, And Person] : oriented to person, place, and time [Impaired Insight] : insight and judgment were intact [Affect] : the affect was normal [Sclera] : the sclera and conjunctiva were normal [PERRL With Normal Accommodation] : pupils were equal in size, round, reactive to light, with normal accommodation [Extraocular Movements] : extraocular movements were intact [Outer Ear] : the ears and nose were normal in appearance [Oropharynx] : the oropharynx was normal [Neck Appearance] : the appearance of the neck was normal [Neck Cervical Mass (___cm)] : no neck mass was observed [Jugular Venous Distention Increased] : there was no jugular-venous distention [Thyroid Diffuse Enlargement] : the thyroid was not enlarged [Thyroid Nodule] : there were no palpable thyroid nodules [Auscultation Breath Sounds / Voice Sounds] : lungs were clear to auscultation bilaterally [Heart Rate And Rhythm] : heart rate was normal and rhythm regular [Heart Sounds] : normal S1 and S2 [Heart Sounds Gallop] : no gallops [Murmurs] : no murmurs [Heart Sounds Pericardial Friction Rub] : no pericardial rub [Arterial Pulses Carotid] : carotid pulses were normal with no bruits [Edema] : there was no peripheral edema [Veins - Varicosity Changes] : there were no varicosital changes [No CVA Tenderness] : no ~M costovertebral angle tenderness [No Spinal Tenderness] : no spinal tenderness [Abnormal Walk] : normal gait [Nail Clubbing] : no clubbing  or cyanosis of the fingernails [Musculoskeletal - Swelling] : no joint swelling seen [Motor Tone] : muscle strength and tone were normal [Skin Color & Pigmentation] : normal skin color and pigmentation [Skin Turgor] : normal skin turgor [] : no rash

## 2023-05-17 NOTE — DISCUSSION/SUMMARY
[FreeTextEntry1] : 5/17/2023.  His neurologic exam shows severe, diffuse left leg weakness, distally worse than proximally, with moderate atrophy, a possible left Babinski sign and probably very mild sensory deficits\par \par To summarize, he was being evaluated by you for  longstanding left leg weakness, proximal and distal, including a severe foot drop.  The explanation for this leg weakness remains unclear to me, whether from the peripheral nervous system as a complication perhaps of diabetes (e.g. LS plexopathy or radiculopathies) or perhaps even from a partial myelopathy, given a possible subtle left Babinski sign.  As part of his work-up, MRI brain on 2/2/5/23 incidentally detected  a small chronic lacunar infarct in the left corona radiata, most likely due to small vessel disease.\par \par The cerebral infarct was apparently asymptomatic, but is a reminder that he should benefit from aggressive vascular risk factor management, especially blood pressure which was elevated in my office.  In terms of lipids, target LDL is less than 70.  For this, there may be a role for a statin and I defer to Dr. Mancilla for management of his risk factors.  He should continue aspirin indefinitely if there is no GI intolerance.\par \par He complains of right calf pain since yesterday.  While there were no obvious signs of DVT, I thought that this should be screened for and I have requested a lower extremity venous Doppler.\par \par He can follow-up in approximately 3 months with carotid Doppler and TCD.  I hope that he remains free of serious trouble.\par

## 2023-05-17 NOTE — HISTORY OF PRESENT ILLNESS
[FreeTextEntry1] : He is a 57 year old right-handed gentleman.\par \par He follows with Dr. Diaz for evaluation and management of left leg weakness.  In approximately 2017, he noted a bandlike sensation at around the level of T10, which was associated with left leg weakness, which has persisted since then.  As part of his work-up for this leg weakness, he had an MRI brain  which incidentally detected  a small infarct.\par \par Workup includes:\par MRI Brain 2/25/23: To my eye, there was a small, chronic infarct in the left posterior corona radiata, about 1 cm in greatest diameter. \par CTA Head and Neck 6/13/22: To my eye was unremarkable.

## 2023-05-17 NOTE — CONSULT LETTER
[Dear  ___] : Dear ~LITA, [Consult Letter:] : I had the pleasure of evaluating your patient, [unfilled]. [Please see my note below.] : Please see my note below. [Consult Closing:] : Thank you very much for allowing me to participate in the care of this patient.  If you have any questions, please do not hesitate to contact me. [Sincerely,] : Sincerely, [FreeTextEntry2] : Dane Diaz MD [FreeTextEntry3] : Richard B. Libman, MD, FRCPC \par , Neurology \par Co-Director, Stroke Center\par Professor of Neurology\par [facility] [employer] [facility]\par  [DrCarroll  ___] : Dr. OTERO

## 2023-10-04 ENCOUNTER — APPOINTMENT (OUTPATIENT)
Dept: NEUROLOGY | Facility: CLINIC | Age: 58
End: 2023-10-04

## 2024-10-02 NOTE — PATIENT PROFILE ADULT - PATIENT/CAREGIVER OFFERED  INTERPRETER SERVICES
October 2, 2024     Patient: Tawanda Chi   YOB: 2006   Date of Visit: 10/2/2024       To Whom it May Concern:    Tawanda Chi was seen in my clinic on 10/2/2024. He  may return to school today.           Sincerely,          Satinder Mckeon MD         yes

## 2025-04-15 NOTE — PHYSICAL EXAM
We have prescribed you antibiotic drops. Please lidia and take as directed.    Please return to the ER if you have chest pain, difficulty breathing, fevers, altered mental status, dizziness, weakness, or any other concerns.      Follow up with your primary care physician.       [FreeTextEntry1] : Vital signs were recorded and unremarkable.  Head neck, ear nose and throat is unremarkable.  There is no carotid bruit, thyromegaly or lymphadenopathy.  Neck is supple with full range of motion.  There is no spine tenderness.  Straight leg raising test is negative and pedal pulsations are normal without any trophic changes in the feet.\par \par Neurologically he appears to have preserved cognitive language and memory functions as also confirmed by his sister who was present throughout this evaluation.  There were no meningeal signs.\par \par Cranial nerve examination was unremarkable without any diabetic retinopathy and he stated that he has been taking metformin and Januvia for diabetes including a baby aspirin for stroke gabapentin losartan and amlodipine and glimepiride.  There is no facial weakness hearing loss and bulbar functions are normal.  Motor evaluation revealed normal strength coordination sensation and reflexes in the upper extremities and in the lower extremity he has 3-4/5 strength in the distal muscles whereas all other muscles appear to be intact.  He has minimal decrease in pin and vibration perception in the feet and ankle reflexes are absent.  There is no Babinski sign.  Gait is unimpaired Romberg sign is negative.  Bladder is not distended.

## 2025-05-14 NOTE — DATA REVIEWED
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 14-May-2025 03:55

## 2025-09-17 ENCOUNTER — APPOINTMENT (OUTPATIENT)
Dept: ORTHOPEDIC SURGERY | Facility: CLINIC | Age: 60
End: 2025-09-17
Payer: COMMERCIAL

## 2025-09-17 ENCOUNTER — NON-APPOINTMENT (OUTPATIENT)
Age: 60
End: 2025-09-17

## 2025-09-17 VITALS — BODY MASS INDEX: 24.43 KG/M2 | WEIGHT: 152 LBS | HEIGHT: 66 IN

## 2025-09-17 DIAGNOSIS — M21.372 FOOT DROP, LEFT FOOT: ICD-10-CM

## 2025-09-17 DIAGNOSIS — M51.379 OTH INTVRT DISC DEGEN, LUMBOSACR W/O LUM BCK OR LW EXTRM PN: ICD-10-CM

## 2025-09-17 DIAGNOSIS — M48.061 SPINAL STENOSIS, LUMBAR REGION WITHOUT NEUROGENIC CLAUDICATION: ICD-10-CM

## 2025-09-17 DIAGNOSIS — G95.9 DISEASE OF SPINAL CORD, UNSPECIFIED: ICD-10-CM

## 2025-09-17 PROCEDURE — 99205 OFFICE O/P NEW HI 60 MIN: CPT

## 2025-09-17 PROCEDURE — 72100 X-RAY EXAM L-S SPINE 2/3 VWS: CPT
